# Patient Record
Sex: FEMALE | Race: WHITE
[De-identification: names, ages, dates, MRNs, and addresses within clinical notes are randomized per-mention and may not be internally consistent; named-entity substitution may affect disease eponyms.]

---

## 2017-01-12 ENCOUNTER — HOSPITAL ENCOUNTER (EMERGENCY)
Dept: HOSPITAL 75 - ER | Age: 22
Discharge: HOME | End: 2017-01-12
Payer: MEDICAID

## 2017-01-12 VITALS — HEIGHT: 63 IN | WEIGHT: 130 LBS | BODY MASS INDEX: 23.04 KG/M2

## 2017-01-12 VITALS — SYSTOLIC BLOOD PRESSURE: 128 MMHG | DIASTOLIC BLOOD PRESSURE: 70 MMHG

## 2017-01-12 DIAGNOSIS — L03.012: Primary | ICD-10-CM

## 2017-01-12 PROCEDURE — 87070 CULTURE OTHR SPECIMN AEROBIC: CPT

## 2017-01-12 PROCEDURE — 10060 I&D ABSCESS SIMPLE/SINGLE: CPT

## 2017-01-12 PROCEDURE — 87077 CULTURE AEROBIC IDENTIFY: CPT

## 2017-01-12 PROCEDURE — 87205 SMEAR GRAM STAIN: CPT

## 2017-01-12 PROCEDURE — 64450 NJX AA&/STRD OTHER PN/BRANCH: CPT

## 2017-01-12 PROCEDURE — 87186 SC STD MICRODIL/AGAR DIL: CPT

## 2017-01-12 NOTE — ED UPPER EXTREMITY
General


Stated Complaint:  FINGER INFECTION


Source:  patient


Exam Limitations:  no limitations





History of Present Illness


Time seen by provider:  17:44


Initial Comments


Pain redness and swelling to the left thumb for 1.5 weeks.  No known infection.

  She states that she has intermittently drained pus from this.


Onset:  last week


Severity:  moderate


Pain/Injury Location:  left thumb


Method of Injury:  unknown


Modifying Factors:  Worse With Movement





Allergies and Home Medications


Allergies


Coded Allergies:  


     latex (Unverified  Allergy, Unknown, 4/27/11)


     paroxetine (Unverified  Allergy, Unknown, 5/23/15)





Home Medications


Naproxen 500 Mg Tablet #30 500 MG PO BID 


   Prescribed by: NOE VALDIVIA on 8/25/16 1949


Sulfamethoxazole/Trimethoprim 1 Each Tablet #14 1 EACH PO BID 


   Prescribed by: NOE VALDIVIA on 1/12/17 1746


Topiramate 100 Mg Tablet 100 MG PO BID (Reported) 





Constitutional:   see HPI


EENTM:   see HPI


Respiratory:   no symptoms reported


Cardiovascular:   no symptoms reported


Genitourinary:   no symptoms reported


Musculoskeletal:   no symptoms reported


Skin:   no symptoms reported


Psychiatric/Neurological:   No Symptoms Reported





Past Medical-Social-Family Hx


Patient Social History


Recent Foreign Travel:  No


Contact w/Someone Who Travel:  No


Recent Hopitalizations:  No





Immunizations Up To Date


Tetanus Booster (TDap):  Less than 5yrs


PED Vaccines UTD:  Yes





Seasonal Allergies


Seasonal Allergies:  No





Surgeries


HX Surgeries:  Yes (RT KNEE SCOPE, EGD)


Surgeries:  Orthopedic





Respiratory


Hx Respiratory Disorders:  No





Cardiovascular


Hx Cardiac Disorders:  No





Neurological


Hx Neurological Disorders:  Yes (essential tremors)


Neurological Disorders:  Seizure Disorder





Reproductive System


Hx Reproductive Disorders:  No





Genitourinary


Hx Genitourinary Disorders:  No





Gastrointestinal


Hx Gastrointestinal Disorders:  No





Musculoskeletal


Hx Musculoskeletal Disorders:  No





Endocrine


Hx Endocrine Disorders:  No





HEENT


HX ENT Disorders:  No





Cancer


Hx Cancer:  No





Psychosocial


Hx Psychiatric Problems:  Yes


Behavioral Health Disorders:  Depression





Integumentary


HX Skin/Integumentary Disorder:  No





Blood Transfusions


Hx Blood Disorders:  No





Physical Exam


Vital Signs





 Vital Sign - Last 12Hours








 1/12/17 1/12/17





 17:48 17:57


 


Temp 98.3 


 


Pulse  70


 


Resp  16


 


B/P  132/70


 


O2 Delivery  Room Air





Capillary Refill :


General Appearance:   WD/WN no apparent distress


HEENT:   PERRL/EOMI normal ENT inspection


Neck:   non-tender full range of motion


Respiratory:   no respiratory distress no accessory muscle use


Elbow/Forearm:  normal inspection, non-tender, no evidence of injury


Wrist:  Yes normal inspection, Yes non-tender, Yes no evidence of injury


Hand:  Left, swelling (erythema and fluctuance over the eponychial/paronychIUM 

of left thumb)


Neurologic/Psychiatric:   alert normal mood/affect oriented x 3


Skin:   normal color warm/dry





I&D :  


   Blade Size:  11


Progress


Digital block done using 2 mL of 0.5 percent Marcaine without epinephrine.  

Incision was then made with 11 blade scalpel over the most fluctuant portion of 

this paronychia.  Culture was collected and sent to lab so there was really no 

purulence expressed.  Area then covered with gauze and Coban.





Progress/Results/Core Measures


Results/Orders


My Orders





 Orders-NOE VALDIVIA


Wound Culture (1/12/17 17:43)


Bupivacaine 0.5% Injection (Sensorcaine (1/12/17 17:45)


Sulfamethoxazole/Trimet Ds Tab (Bactrim (1/12/17 17:45)


Bupivacaine 0.5% Injection (Sensorcaine (1/12/17 17:39)





Medications Given in ED








 Current Medications








 Medications  Dose


 Ordered  Sig/Alirio


 Route  Start Time


 Stop Time Status Last Admin


Dose Admin


 


 Bupivacaine HCl  30 ml  STK-MED ONCE


 .ROUTE  1/12/17 17:39


 1/12/17 17:46 DC 1/12/17 17:48


30 ML











Vital Signs/I&O








 Vital Sign - Last 12Hours








 1/12/17 1/12/17





 17:48 17:57


 


Temp 98.3 97.5


 


Pulse  70


 


Resp  16


 


B/P  132/70


 


O2 Delivery  Room Air














Departure


Impression


Impression:  


 Primary Impression:  


 Paronychia of finger


 Qualified Code:  L03.012 - Cellulitis of left finger


Disposition:  01 HOME, SELF-CARE


Condition:  Stable





Departure-Patient Inst.


Decision time for Depature:  17:45


Referrals:  


NO,LOCAL PHYSICIAN (PCP/Family)


Primary Care Physician


Patient Instructions:  Paronychia





Add. Discharge Instructions:


1.  Return to ER for any concerns


2.  Follow-up with your doctor next week


3.  Antibiotics as directed


Scripts


Sulfamethoxazole/Trimethoprim (Bactrim Ds Tablet)1 Each Tablet1 Each PO BID #14 

TAB


   Prov:NOE VALDIVIA         1/12/17








NOE VALDIVIA 12, 2017 17:46

## 2017-01-12 NOTE — XMS REPORT
Continuity of Care Document

 Created on: 06/10/2016



DEMARIO PRICE

External Reference #: [id number]

: 1995

Sex: Female



Demographics







 Address  [address]

[place]  [address]

 

 Home Phone  [phone]

 

 Preferred Language  English

 

 Marital Status  Unknown

 

 Taoist Affiliation  Unknown

 

 Race  Unknown

 

 Ethnic Group  Unknown





Author







 Author  [facility]

 

 Organization  [facility]

 

 Address  Unknown

 

 Phone  Unavailable







Support







 Name  Relationship  Address  Phone

 

 NOE VALDIVIA  Caregiver  [facility]

 [address] [place]  [address] [phone]

 

 NO, LOCAL PHYSICIAN  Caregiver  Unknown  Unavailable

 

 ASHU SHARMA MD  Caregiver  [address], [address]

[place]  [address] [phone]

 

 MELISSA, BILL  Next Of Kin  [address]

[address]

[place]  [address] [phone]







Care Team Providers







 Care Team Member Name  Role  Phone

 

 NO, LOCAL PHYSICIAN  PCP  Unavailable







Advance Directives







 Directive  Response  Recorded Date/Time

 

 Advance Directives  No  06/10/16 3:04pm

 

 Organ Donor  No  06/10/16 3:04pm

 

 Resuscitation Status  Full Code  06/10/16 3:04pm







Chief Complaint and Reason for Visit







 Chief Complaint  Lower Extremity

 

 Reason for Visit  Internal derangement of right knee







Problems

Active Problems







 Medical Problem  Onset Date  Status

 

 Contusion of foot  Unknown  Acute

 

 Internal derangement of right knee  Unknown  Acute

 

 Traumatic hematoma of foot  Unknown  Acute







Medications

Current Home Medications







 Medication  Dose  Units  Route  Directions  Days/Qty  Instructions  Start Date

 

 Meloxicam 7.5 Mg  7.5  Mg  Oral  Daily  30     06/10/16





Past Home Medications







 Medication  Directions  Ordered  Status

 

 Hydrocodone Bit/Acetaminophen 1 Each Tablet, 1 Ea Oral  Every 6 Hours as 
needed for Severe Pain  05/23/15  Discontinued







Social History







 Social History Problem  Response  Recorded Date/Time

 

 Alcohol Use  Denies Use  06/10/2016 3:04pm

 

 Recreational Drug Use  No  06/10/2016 3:04pm

 

 Recent Foreign Travel  No  06/10/2016 3:04pm

 

 Recent Infectious Disease Exposure  No  06/10/2016 3:04pm

 

 Hospitalization with Isolation  Denies  06/10/2016 3:04pm

 

 Smoking Status  Never a Smoker  06/10/2016 3:04pm









 Query  Response  Start Date  Stop Date

 

 Smoking Status  Never a Smoker      







Hospital Discharge Instructions

No hospital discharge instructions.



Plan of Care







 Discharge Date  06/10/16 3:39pm

 

 Disposition  01 HOME, SELF-CARE

 

 Condition at Discharge  Improved

 

 Instructions/Education Provided  NO INSTRUCTIONS GIVEN

 

 Prescriptions  See Medication Section

 

 Referrals  GISEL ROSA MD, RICHARD A DO - NO,LOCAL PHYSICIAN - Primary Care Physician





ASHLEY MIX FLOYD R MD - 





LUZMARIA,TANYA GUZMÁN MD - 

 

 Additional Instructions/Education  1.  Call one of the physicians listed to 
make an appointment to be seen to 

schedule a MRI of your knee  

 2.  Medication as directed  

 3.  All discharge instructions reviewed with patient and/or family. Voiced 

understanding.







Functional Status

No functional status results.



Allergies, Adverse Reactions, Alerts







 Allergen  Type  Severity  Reaction  Status  Last Updated

 

 paroxetine ([id number])  Allergy  Unknown     Active  05/23/15

 

 Latex  Allergy  Unknown     Active  11







Immunizations

No immunization records.



Vital Signs

Acute Vital Signs





 Vital  Response  Date/Time

 

 Temperature (Fahrenheit)  97.4 degrees F (97.6 - 99.5)  06/10/2016 3:04pm

 

 Temperature (Calculated Celsius)  36.91726 degrees C (36.4 - 37.5)  06/10/2016 
3:04pm

 

 Temperature Source  Temporal  06/10/2016 3:04pm

 

 Pulse Rate (Adolescent 12-19yrs)  108 bpm (56 - 106)  06/10/2016 3:39pm

 

 O2 Sat by Pulse Oximetry  98 % (88 - 100)  06/10/2016 3:39pm

 

 Respiratory Rate (Adolescent 12-19yrs)  20 bpm (15 - 20)  06/10/2016 3:39pm

 

 Blood Pressure  /   

 

    Blood Pressure Systolic (Adolescent 12-19yrs)  121 mm Hg (115 - 120)  06/10/
2016 3:39pm

 

 Pain      

 

    Pain Intensity  10  06/10/2016 3:04pm

 

 Height (Feet)  5 feet  06/10/2016 3:04pm

 

 Height (Inches)  4 inches  06/10/2016 3:04pm

 

 Height (Calculated Centimeters)  162.563642 cm  06/10/2016 3:04pm

 

 Weight (Pounds)  139 pounds  06/10/2016 3:04pm

 

 Weight (Calculated Kilograms)  63.823901 kilograms  06/10/2016 3:04pm

 

 Calculated BMI  23.86  06/10/2016 3:04pm







Results

No known relevant diagnostic tests, laboratory data and/or discharge summary.



Procedures

No known history of procedures.



Encounters







 Encounter  Location  Arrival/Admit Date  Discharge/Depart Date  Attending 
Provider

 

 Departed Emergency Room  [facility]  06/10/16 2:57pm  06/10
/16 3:39pm  NOE VALDIVIA

 

 Registered Referred  [facility]  16 3:27pm     DAVID HAND











 Recent Diagnosis

## 2017-10-11 ENCOUNTER — HOSPITAL ENCOUNTER (OUTPATIENT)
Dept: HOSPITAL 75 - RAD | Age: 22
End: 2017-10-11
Attending: FAMILY MEDICINE
Payer: COMMERCIAL

## 2017-10-11 DIAGNOSIS — Z3A.10: ICD-10-CM

## 2017-10-11 DIAGNOSIS — O46.91: Primary | ICD-10-CM

## 2017-10-11 PROCEDURE — 76801 OB US < 14 WKS SINGLE FETUS: CPT

## 2017-10-11 NOTE — DIAGNOSTIC IMAGING REPORT
First trimester OB ultrasound.



INDICATION: Spotting.



FINDINGS: There is a normal-appearing single intrauterine

pregnancy. An embryo is seen with cardiac activity at 174 beats

per minute.



The crown-rump length is at 10 weeks and 5 days. ELIZABETH is [birth year]. 



IMPRESSION: Live single intrauterine pregnancy.



Dictated by: 



  Dictated on workstation # [id number]

## 2017-12-04 ENCOUNTER — HOSPITAL ENCOUNTER (OUTPATIENT)
Dept: HOSPITAL 75 - RAD | Age: 22
End: 2017-12-04
Attending: FAMILY MEDICINE
Payer: MEDICAID

## 2017-12-04 DIAGNOSIS — Z34.92: Primary | ICD-10-CM

## 2017-12-04 DIAGNOSIS — Z3A.18: ICD-10-CM

## 2017-12-04 PROCEDURE — 76805 OB US >/= 14 WKS SNGL FETUS: CPT

## 2017-12-04 NOTE — DIAGNOSTIC IMAGING REPORT
INDICATION: Fetal survey.



TECHNIQUE: Multiple real-time grayscale images were obtained over

the gravid uterus.



COMPARISON: 10/11/2017



FINDINGS: Fetal heart rate is 143 beats per minute. The amniotic

fluid is normal. The placenta is fundal and anterior with no

placenta previa. The lower uterine segment demonstrates closed

cervix, 4.3 cm in length.



The cord insertion, stomach, bladder, two umbilical arteries,

posterior fossa and lateral ventricles appear unremarkable. The

four-chamber view demonstrates a tiny mild focus of hypodensity

which could relate to an echogenic focus of the left ventricle, a

soft marker for aneuploidy. The upper and mid spine is normal in

appearance. The lower spine is not well demonstrated. The

maternal adnexa are obscured by bowel gas.



Biometrical measurements are as follows:

Biparietal 4.33 cm, age 19 weeks 1 days.

Head circumference 15.90 cm, age 18 weeks 6 days.

Abdominal circumference 12.26 cm, age 18 weeks 0 days.

Femur length 2.90 cm, age 19 weeks 0 days.



Sonographic estimate age: 18 weeks 6 days.

Sonographic estimated date of delivery: 05-01-18.



Estimated Fetal Weight: 240 gm (+/- 35  gm).

LMP percentile: 46%.



Fetal heart rate: 143 beats per minute.



Fetal number: 1 of 1.



IMPRESSION: 

1. The lower most aspect of the spine is not well demonstrated

due to fetal position. A followup exam is recommended within 2

weeks to reevaluate.

2. Questionable tiny echogenic focus in the left ventricle. This

is a soft marker that could be associated with aneuploidy.

Correlate clinically.



Dictated by: 



  Dictated on workstation # [id number]

## 2017-12-15 ENCOUNTER — HOSPITAL ENCOUNTER (OUTPATIENT)
Dept: HOSPITAL 75 - ER | Age: 22
Discharge: HOME | End: 2017-12-15
Attending: FAMILY MEDICINE
Payer: MEDICAID

## 2017-12-15 VITALS — WEIGHT: 125.25 LBS | BODY MASS INDEX: 22.19 KG/M2 | HEIGHT: 63 IN

## 2017-12-15 VITALS — SYSTOLIC BLOOD PRESSURE: 118 MMHG | DIASTOLIC BLOOD PRESSURE: 64 MMHG

## 2017-12-15 DIAGNOSIS — Z3A.24: ICD-10-CM

## 2017-12-15 DIAGNOSIS — O36.8120: Primary | ICD-10-CM

## 2017-12-15 LAB
BILIRUB UR QL STRIP: NEGATIVE
KETONES UR QL STRIP: NEGATIVE
LEUKOCYTE ESTERASE UR QL STRIP: (no result)
NITRITE UR QL STRIP: NEGATIVE
PH UR STRIP: 7 [PH] (ref 5–9)
PROT UR QL STRIP: NEGATIVE
SP GR UR STRIP: 1.01 (ref 1.02–1.02)
SQUAMOUS #/AREA URNS HPF: (no result) /HPF
UROBILINOGEN UR-MCNC: NORMAL MG/DL
WBC #/AREA URNS HPF: (no result) /HPF

## 2017-12-15 PROCEDURE — 99212 OFFICE O/P EST SF 10 MIN: CPT

## 2017-12-15 PROCEDURE — 81000 URINALYSIS NONAUTO W/SCOPE: CPT

## 2017-12-15 NOTE — XMS REPORT
[facility]

 Created on: 2017



Claudia Vaz

External Reference #: [id number]

: 1995

Sex: Female



Demographics







 Address  [address] [place]  [address]

 

 Preferred Language  Unknown

 

 Marital Status  Unknown

 

 [employer]ybeterian Affiliation  Unknown

 

 Race  Unknown

 

 Ethnic Group  Unknown





Author







 Author  ISH HERNANDEZ

 

 Organization  [facility]

 

 Address  [address] [place]  [address]



 

 Phone  [phone]







Care Team Providers







 Care Team Member Name  Role  Phone

 

 ISH HERNANDEZ  Unavailable  [phone]







PROBLEMS







 Type  Condition  ICD9-CM Code  ZIG43-KB Code  Onset Dates  Condition Status  
SNOMED Code

 

 Problem  Seasonal allergic rhinitis, unspecified allergic rhinitis trigger     
J30.2     Active  [id number]

 

 Problem  Other chronic pain     G89.29     Active  [id number]







ALLERGIES







 Substance  Reaction  Event Type  Date  Status

 

 latex  Unknown  Non Drug Allergy  26 May, 2017  Active







SOCIAL HISTORY

Never Assessed



PLAN OF CARE







 Activity  Details









  









 Follow Up  prn Reason:







VITAL SIGNS







 Height  63 in  2017

 

 Weight  130.2 lbs  2017

 

 Temperature  98.8 degrees Fahrenheit  2017

 

 Heart Rate  84 bpm  2017

 

 Respiratory Rate  22   2017

 

 BMI  23.06 kg/m2  2017

 

 Blood pressure systolic  100 mmHg  2017

 

 Blood pressure diastolic  70 mmHg  2017







MEDICATIONS

Unknown Medications



RESULTS







 Name  Result  Date  Reference Range

 

 A1C     2017   

 

 Hemoglobin A1c  5.4     4.8-5.6

 

 CBC     2017   

 

 WBC  7.2     3.4-10.8

 

 RBC  4.34     3.77-5.28

 

 Hemoglobin  13.7     11.1-15.9

 

 Hematocrit  41.1     34.0-46.6

 

 MCV  95     79-97

 

 MCH  31.6     26.6-33.0

 

 MCHC  33.3     31.5-35.7

 

 RDW  13.1     12.3-15.4

 

 Platelets  369     150-379

 

 Neutrophils  53      

 

 Lymphs  34      

 

 Monocytes  9      

 

 Eos  2      

 

 Basos  1      

 

 Neutrophils (Absolute)  3.9     1.4-7.0

 

 Lymphs (Absolute)  2.4     0.7-3.1

 

 Monocytes(Absolute)  0.6     0.1-0.9

 

 Eos (Absolute)  0.1     0.0-0.4

 

 Baso (Absolute)  0.1     0.0-0.2

 

 Immature Granulocytes  1      

 

 Immature Grans (Abs)  0.0     0.0-0.1

 

 CMP     2017   

 

 Glucose, Serum  91     65-99

 

 BUN  10     6-20

 

 Creatinine, Serum  0.58     0.57-1.00

 

 eGFR If NonAfricn Am  133         >59

 

 eGFR If Africn Am  153         >59

 

 BUN/Creatinine Ratio  17     9-23

 

 Sodium, Serum  139     134-144

 

 Potassium, Serum  4.6     3.5-5.2

 

 Chloride, Serum  102     

 

 Carbon Dioxide, Total  22     18-29

 

 Calcium, Serum  9.4     8.7-10.2

 

 Protein, Total, Serum  6.4     6.0-8.5

 

 Albumin, Serum  4.5     3.5-5.5

 

 Globulin, Total  1.9     1.5-4.5

 

 A/G Ratio  2.4     1.2-2.2

 

 Bilirubin, Total  0.6     0.0-1.2

 

 Alkaline Phosphatase, S  54     

 

 AST (SGOT)  12     0-40

 

 ALT (SGPT)  7     0-32







PROCEDURES







 Procedure  Date Ordered  Result  Body Site

 

 GLYCATED HEMOGLOBIN TEST  May 26, 2017      

 

 COMPLETE CBC W/AUTO DIFF WBC  May 26, 2017      

 

 COMPREHEN METABOLIC PANEL  May 26, 2017      

 

 VENIPUNCT, ROUTINE*  May 26, 2017      







IMMUNIZATIONS

No Known Immunizations



MEDICAL (GENERAL) HISTORY







 Type  Description  Date

 

 Medical History  epilepsy   

 

 Medical History  asthma   

 

 Medical History  diabetes type 1   

 

 Surgical History  Right knee scope   

 

 Surgical History  EGD  

 

 Surgical History  tonsilectomy   

 

 Surgical History  wisdom teeth   

 

 Hospitalization History  wisdom teeth   

 

 Hospitalization History  grand mal seizure x 1 week in 2nd grade

## 2017-12-15 NOTE — XMS REPORT
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
mass/volume)            35 g/dL            32-36        

 

 Automated erythrocyte distribution width ratio            12.5 %            
10.0-14.5        

 

 Automated blood platelet count (count/volume)            282 10*3/uL          
  130-400        

 

 Automated blood platelet mean volume measurement            10.1 [[id number]]     
       7.4-10.4        

 

 Automated blood neutrophils/100 leukocytes            51 %            42-75   
     

 

 Automated blood lymphocytes/100 leukocytes            38 %            12-44   
     

 

 Blood monocytes/100 leukocytes            9 %            0-12        

 

 Automated blood eosinophils/100 leukocytes            1 %            0-10     
   

 

 Automated blood basophils/100 leukocytes            1 %            0-10        

 

 Blood neutrophils automated count (number/volume)            4.4 10*3         
   1.8-7.8        

 

 Blood lymphocytes automated count (number/volume)            3.2 10*3         
   1.0-4.0        

 

 Blood monocytes automated count (number/volume)            0.8 10*3            
0.0-1.0        

 

 Automated eosinophil count            0.1 10*3/uL            0.0-0.3        

 

 Automated blood basophil count (count/volume)            0.1 10*3/uL          
  0.0-0.1        









 Whole blood basic metabolic panel - 17 05:45         









 Serum or plasma sodium measurement (moles/volume)            137 mmol/L       
     135-145        

 

 Serum or plasma potassium measurement (moles/volume)            4.0 mmol/L    
        3.6-5.0        

 

 Serum or plasma chloride measurement (moles/volume)            110 mmol/L     
               

 

 Carbon dioxide            19 mmol/L            21-32        

 

 Serum or plasma anion gap determination (moles/volume)            8 mmol/L    
        5-14        

 

 Serum or plasma urea nitrogen measurement (mass/volume)            6 mg/dL    
        7-18        

 

 Serum or plasma creatinine measurement (mass/volume)            0.51 mg/dL    
        0.60-1.30        

 

 Serum or plasma urea nitrogen/creatinine mass ratio            12             
NRG        

 

 Serum or plasma creatinine measurement with calculation of estimated 
glomerular filtration rate            >             NRG        

 

 Serum or plasma glucose measurement (mass/volume)            78 mg/dL         
           

 

 Serum or plasma calcium measurement (mass/volume)            8.6 mg/dL        
    8.5-10.1        









 Serum or plasma thyroxine (T4) free measurement (mass/volume) - 17 05:45
         









 Serum or plasma thyroxine (T4) free measurement (mass/volume)            1.39 
ng/dL            0.70-1.48        









 Hemoglobin A1c - 17 05:45         









 Hemoglobin A1c            4.8 %            4.5-6.2        









 Serum or plasma thyroperoxidase antibody assay (units/volume) - 17 05:45
         









 Serum or plasma thyroperoxidase antibody assay (units/volume)            40.34 
%            0..00        









 TRIIODOTHRYONINE T3 FREE - 17 05:45         









 TRIIODOTHYRONINE T3 FREE            3.6 pg/mL            2.4-4.5        









 Total triiodothyronine (T3) measurement - 17 05:45         









 Total triiodothyronine (T3) measurement            2.0 %            0.6-1.8   
     









 Capillary blood glucose measurement by glucometer (mass/volume) - 17 05:
47         









 Capillary blood glucose measurement by glucometer (mass/volume)            78 
mg/dL                    



                                                    



Encounters

      





 ACCT No.            Visit Date/Time            Discharge            Status    
        Pt. Type            Provider            Facility            Loc./Unit  
          Complaint        

 

 [id number]            2017 12:57:00            2017 23:59:59    
        CLS            Outpatient            ANABELLA FUNK, MARLINE MCLEOD            [facility]            RAD            15 WKS GESTATION OF 
PREGNANCY        

 

 [id number]            2017 16:40:00            2017 08:43:00    
        DIS            Inpatient            AJ LAUREN MD            [facility]            LDRP            HYPEREMESIS GRAVIDERIM 
       

 

 [id number]            10/11/2017 15:19:00            10/11/2017 23:59:59    
        CLS            Outpatient            ANABELLA FUNK, MARLINE MCLEOD            [facility]            RAD            VAGINAL BLEEDING        

 

 [id number]            2017 17:30:00            2017 18:15:00    
        DIS            Emergency            NOE VALDIVIA APRN            [facility]            ER            FINGER INFECTION        

 

 [id number]            2016 19:20:00            2016 20:02:00    
        DIS            Emergency            NOE VALDIVIA APRN            [facility]            ER            DENTAL PAIN        

 

 [id number]            2016 09:13:00            2016 09:56:00    
        DIS            Emergency            JULIÁN FUNK, CELI ARGUELLO            
[facility]            ER            R THUMB PAIN        

 

 [id number]            06/10/2016 14:57:00            06/10/2016 15:39:00    
        DIS            Emergency            NOE VALDIVIA            [facility]            ER            RIGHT KNEE PAIN        

 

 [id number]            2016 15:27:00            2016 23:59:59    
        CLS            Outpatient            DAVID HAND            
[facility]            QUICK                     

 

 [id number]            2015 12:12:00            2015 13:25:00    
        DIS            Emergency            NOE VALDIVIA            [facility]            ER            CHAIR FELL ON L FOOT     
   

 

 [id number]            12/15/2017 19:40:00                         ACT       
     Emergency            RANDY FUNK, YURI MCLEOD            [facility]            ER            CRAMPING, CANT FEEL  MOVEMENT FROM BABY     
   

 

 [id number]            10/10/2011 12:09:00                                   
   Document Registration                                                       
     

 

 [id number]            2011 15:05:00                                   
   Document Registration                                                       
     

 

 [id number]            2011 10:47:00                                   
   Document Registration                                                       
     

 

 [id number]            2011 21:10:00                                   
   Document Registration                                                       
     

 

 [id number]            2011 12:04:00                                   
   Document Registration                                                       
     

 

 [id number]            2011 11:50:00                                   
   Document Registration                                                       
     

 

 [id number]            2011 07:24:00                                   
   Document Registration

## 2017-12-20 NOTE — PHYSICIAN QUERY-FINAL DX
DAVIDA CLEMENTS 12/20/17 1118:


Clinic Account Progress/Dx


Physician Query:


Please give diagnosis


Date of Service





Dec 15, 2017 at 19:54





MARLINE KYLE MD 12/28/17 0740:


Clinic Account Progress/Dx


DIAGNOSIS:


Diagnosis


1.  Decreased fetal movement


2.  IUP at 24 weeks.











DAVIDA CLEMENTS Dec 20, 2017 11:18


MARLINE KYLE MD Dec 28, 2017 07:40

## 2018-01-24 ENCOUNTER — HOSPITAL ENCOUNTER (EMERGENCY)
Dept: HOSPITAL 75 - ER | Age: 23
Discharge: HOME | End: 2018-01-24
Payer: MEDICAID

## 2018-01-24 VITALS — HEIGHT: 63 IN | BODY MASS INDEX: 23.04 KG/M2 | WEIGHT: 130 LBS

## 2018-01-24 VITALS — DIASTOLIC BLOOD PRESSURE: 50 MMHG | SYSTOLIC BLOOD PRESSURE: 100 MMHG

## 2018-01-24 DIAGNOSIS — G40.909: ICD-10-CM

## 2018-01-24 DIAGNOSIS — K64.8: ICD-10-CM

## 2018-01-24 DIAGNOSIS — O99.352: ICD-10-CM

## 2018-01-24 DIAGNOSIS — Z3A.26: ICD-10-CM

## 2018-01-24 DIAGNOSIS — O99.612: Primary | ICD-10-CM

## 2018-01-24 DIAGNOSIS — O99.342: ICD-10-CM

## 2018-01-24 DIAGNOSIS — F32.9: ICD-10-CM

## 2018-01-24 PROCEDURE — 99282 EMERGENCY DEPT VISIT SF MDM: CPT

## 2018-01-24 NOTE — ED GI
General


Chief Complaint:  Rect Problems


Stated Complaint:  HEMROID





History of Present Illness


Date Seen by Provider:  Jan 24, 2018


Time Seen by Provider:  16:50


Initial Comments


22-year-old  female Patient is 26 weeks gestation. She reports no 

bowel movement for 10 days. She is having mild abdominal pain. She is feeling 

fetal movement approximately every 4 hours. She has some lactose intolerance 

and usually has diarrhea after drinking milk. She is taking a prenatal vitamin, 

it is the same one she has been taking since beginning of the pregnancy. She 

has no history of hemorrhoids in the past, she noticed tenderness and fullness, 

with external hemorrhoid present yesterday. She attempted to see Dr. Kyle 

today and was referred to the emergency department.  She is concerned the 

hemorrhoid is "blocking ability to pass stools."


Timing/Duration:  Other (10 days)





Allergies and Home Medications


Allergies


Coded Allergies:  


     divalproex sodium (Verified  Allergy, Unknown, 12/15/17)


     latex (Unverified  Allergy, Unknown, 4/27/11)


     paroxetine (Unverified  Allergy, Unknown, 5/23/15)





Home Medications


Metoclopramide HCl 5 Mg Tablet, 5 MG PO Q6H PRN for NAUSEA/VOMITING-1ST LINE 

for 30 Days, #90


   Prescribed by: MARIBELL NICK on 11/3/17 0843


Prenatal Vit No.124/Iron/FA 1 Each Tablet, 1 EACH PO DAILY, (Reported)





Review of Systems


Constitutional:  no symptoms reported, see HPI


Gastrointestinal:  See HPI, Abdominal Pain, Constipated, Other (external 

hemorrhoid)





All Other Systems Reviewed


Negative Unless Noted:  Yes





Past Medical-Social-Family Hx


Patient Social History


Recent Foreign Travel:  No


Contact w/Someone Who Travel:  No


Recent Hopitalizations:  No





Immunizations Up To Date


Tetanus Booster (TDap):  Less than 5yrs


PED Vaccines UTD:  Yes


Date of Influenza Vaccine:  Aug 15, 2017





Seasonal Allergies


Seasonal Allergies:  No





Surgeries


Surgeries:  Orthopedic





Neurological


Neurological Disorders:  Seizure Disorder





Reproductive System


Hx Reproductive Disorders:  No





Psychosocial


Behavioral Health Disorders:  Depression





Reviewed Nursing Assessment


Reviewed/Agree w Nursing PMH:  Yes





Physical Exam


Vital Signs





 VS - Last 72 Hours, by Label








 1/24/18





 16:51


 


Temp 98.1


 


Pulse 70


 


Resp 16


 


B/P (MAP) 100/50 (67)


 


Pulse Ox 98


 


O2 Delivery Room Air





Capillary Refill :


General Appearance:  WD/WN, no apparent distress


Respiratory:  chest non-tender, lungs clear, normal breath sounds


Cardiovascular:  normal peripheral pulses, regular rate, rhythm, no murmur


Gastrointestinal:  normal bowel sounds, non tender, soft, distended (compatible 

with third trimester pregnancy), No rebound


Rectal:  normal exam, normal rectal tone, hemorrhoids (0.5 cm, tender, non-

inflammed, soft, right external. No impacted stool in rectum ), tenderness


Extremities:  normal range of motion, non-tender, no calf tenderness, normal 

capillary refill


Neurologic/Psychiatric:  no motor/sensory deficits, alert, normal mood/affect, 

oriented x 3


Skin:  normal color, warm/dry





Progress/Results/Core Measures


Results/Orders


Vital Signs/I&O





Vital Sign - Last 12Hours








 1/24/18





 16:51


 


Temp 98.1


 


Pulse 70


 


Resp 16


 


B/P (MAP) 100/50 (67)


 


Pulse Ox 98


 


O2 Delivery Room Air








Progress Note :  


   Time:  16:50


Progress Note


Initial evaluation, fetal heart tones 140s to 150s. 


1700 discussed at length with the patient and her mother the importance of 

increasing fluid in her diet, high fiber diet, over-the-counter hydrocortisone 

for inflamed hemorrhoid, stool softener, warm moist compresses, and avoid 

straining. Sits baths were recommended, the patient reports that she does not 

have a bath tub. Discharge instructions and return precautions reviewed at 

length with the patient and her mother, all questions answered.





Departure


Impression


Impression:  


 Primary Impression:  


 Constipation during pregnancy


 Qualified Codes:  O99.613 - Diseases of the digestive system complicating 

pregnancy, third trimester; K59.00 - Constipation, unspecified


 Additional Impression:  


 Hemorrhoid


 Qualified Codes:  K64.0 - First degree hemorrhoids


Disposition:  01 HOME, SELF-CARE


Condition:  Stable





Departure-Patient Inst.


Decision time for Depature:  17:15


Referrals:  


MARLINE KYLE MD (PCP/Family)


Primary Care Physician


Patient Instructions:  Constipation, Adult (DC), Hemorrhoids (DC), High Fiber 

Diet





Add. Discharge Instructions:  


Increase water intake while awake, 16 ounces every 2 hours.


Ambulates 10 minutes every hour while awake.


Warm moist compresses to hemorrhoid, 10 minutes every 2 hours.


Increase high-fiber foods in diet.


Colace over-the-counter stool softener, 1 tablet twice daily until having 

regular stools.


Avoid straining or sitting on toilet for prolonged period of time.


Hydrocortisone cream, applied to hemorrhoid 3-4 times daily.


Follow-up with Dr. Kyle if no improvement in symptoms in the next 2 days.


Return to emergency department for increased abdominal pain, lack of fetal 

movement, vaginal bleeding or discharge, or new problems.





All discharge instructions reviewed with patient and/or family. Voiced 

understanding.





Copy


Copies To 1:   MARLINE KYLE MD, AMY ARNP Jan 24, 2018 16:48

## 2018-02-15 ENCOUNTER — HOSPITAL ENCOUNTER (OUTPATIENT)
Dept: HOSPITAL 75 - LAB | Age: 23
End: 2018-02-15
Attending: FAMILY MEDICINE
Payer: MEDICAID

## 2018-02-15 DIAGNOSIS — Z34.93: Primary | ICD-10-CM

## 2018-02-15 PROCEDURE — 82951 GLUCOSE TOLERANCE TEST (GTT): CPT

## 2018-02-15 PROCEDURE — 82962 GLUCOSE BLOOD TEST: CPT

## 2018-02-15 PROCEDURE — 82952 GTT-ADDED SAMPLES: CPT

## 2018-02-15 PROCEDURE — 36415 COLL VENOUS BLD VENIPUNCTURE: CPT

## 2018-02-22 ENCOUNTER — HOSPITAL ENCOUNTER (OUTPATIENT)
Dept: HOSPITAL 75 - RAD | Age: 23
End: 2018-02-22
Attending: FAMILY MEDICINE
Payer: MEDICAID

## 2018-02-22 DIAGNOSIS — Z3A.30: ICD-10-CM

## 2018-02-22 DIAGNOSIS — Z34.93: Primary | ICD-10-CM

## 2018-02-22 PROCEDURE — 76816 OB US FOLLOW-UP PER FETUS: CPT

## 2018-02-22 NOTE — DIAGNOSTIC IMAGING REPORT
INDICATION: Followup fetal heart and spine.



TECHNIQUE: Multiple real-time grayscale images were obtained over

the gravid uterus.



COMPARISON: 12/04/2017.



FINDINGS: There is a single live fetus in a cephalic

presentation. The placenta is posterior. The amniotic fluid

volume is normal. Fetal heart rate was recorded at 143 beats per

minute. Fetal spine is unremarkable on today's study. The

previously noted echogenic focus in the left ventricle is not

appreciated on today's exam.



Biometrical measurements are as follows:

Biparietal 7.9 cm, age 31 weeks 4 days.

Head circumference 28.4 cm, age 31 weeks 1 days.

Abdominal circumference 26.0 cm, age 30 weeks 2 days.

Femur length 5.5 cm, age 29 weeks 0 days.



Sonographic estimate age: 30 weeks 4 days.

Sonographic estimated date of delivery: 04/29/18.



Estimated Fetal Weight: 1485 gm (+/- 217 gm).

LMP percentile: 40%.



Fetal heart rate: 143 beats per minute.



Fetal number: 1 of 1.



IMPRESSION: Single live IUP of approximately 30-31 weeks

gestational age demonstrating normal interval growth when

compared with prior exam. Fetal spine and fetal heart are

unremarkable on today's exam.



Dictated by: 



  Dictated on workstation # [id number]

## 2018-03-27 ENCOUNTER — HOSPITAL ENCOUNTER (OUTPATIENT)
Dept: HOSPITAL 75 - WSO | Age: 23
Discharge: HOME | End: 2018-03-27
Attending: FAMILY MEDICINE
Payer: MEDICAID

## 2018-03-27 VITALS — DIASTOLIC BLOOD PRESSURE: 66 MMHG | SYSTOLIC BLOOD PRESSURE: 123 MMHG

## 2018-03-27 VITALS — DIASTOLIC BLOOD PRESSURE: 72 MMHG | SYSTOLIC BLOOD PRESSURE: 112 MMHG

## 2018-03-27 VITALS — SYSTOLIC BLOOD PRESSURE: 114 MMHG | DIASTOLIC BLOOD PRESSURE: 66 MMHG

## 2018-03-27 VITALS — SYSTOLIC BLOOD PRESSURE: 117 MMHG | DIASTOLIC BLOOD PRESSURE: 69 MMHG

## 2018-03-27 VITALS — HEIGHT: 63 IN | WEIGHT: 144 LBS | BODY MASS INDEX: 25.52 KG/M2

## 2018-03-27 DIAGNOSIS — N85.8: ICD-10-CM

## 2018-03-27 DIAGNOSIS — O99.89: Primary | ICD-10-CM

## 2018-03-27 DIAGNOSIS — Z3A.34: ICD-10-CM

## 2018-03-27 LAB
BASOPHILS # BLD AUTO: 0 10^3/UL (ref 0–0.1)
BASOPHILS NFR BLD AUTO: 0 % (ref 0–10)
BASOPHILS NFR BLD MANUAL: 0 %
EOSINOPHIL # BLD AUTO: 0 10^3/UL (ref 0–0.3)
EOSINOPHIL NFR BLD AUTO: 0 % (ref 0–10)
EOSINOPHIL NFR BLD MANUAL: 0 %
ERYTHROCYTE [DISTWIDTH] IN BLOOD BY AUTOMATED COUNT: 12.7 % (ref 10–14.5)
HCT VFR BLD CALC: 31 % (ref 35–52)
HGB BLD-MCNC: 10.5 G/DL (ref 11.5–16)
LYMPHOCYTES # BLD AUTO: 2.2 X 10^3 (ref 1–4)
LYMPHOCYTES NFR BLD AUTO: 13 % (ref 12–44)
MANUAL DIFFERENTIAL PERFORMED BLD QL: YES
MCH RBC QN AUTO: 32 PG (ref 25–34)
MCHC RBC AUTO-ENTMCNC: 34 G/DL (ref 32–36)
MCV RBC AUTO: 93 FL (ref 80–99)
MONOCYTES # BLD AUTO: 0.8 X 10^3 (ref 0–1)
MONOCYTES NFR BLD AUTO: 5 % (ref 0–12)
MONOCYTES NFR BLD: 4 %
NEUTROPHILS # BLD AUTO: 14 X 10^3 (ref 1.8–7.8)
NEUTROPHILS NFR BLD AUTO: 82 % (ref 42–75)
NEUTS BAND NFR BLD MANUAL: 79 %
NEUTS BAND NFR BLD: 2 %
PLATELET # BLD: 312 10^3/UL (ref 130–400)
PMV BLD AUTO: 9.3 FL (ref 7.4–10.4)
RBC # BLD AUTO: 3.29 10^6/UL (ref 4.35–5.85)
RBC MORPH BLD: NORMAL
VARIANT LYMPHS NFR BLD MANUAL: 15 %
WBC # BLD AUTO: 17.1 10^3/UL (ref 4.3–11)

## 2018-03-27 PROCEDURE — 96374 THER/PROPH/DIAG INJ IV PUSH: CPT

## 2018-03-27 PROCEDURE — 99214 OFFICE O/P EST MOD 30 MIN: CPT

## 2018-03-27 PROCEDURE — 85027 COMPLETE CBC AUTOMATED: CPT

## 2018-03-27 PROCEDURE — 85007 BL SMEAR W/DIFF WBC COUNT: CPT

## 2018-03-27 PROCEDURE — 96361 HYDRATE IV INFUSION ADD-ON: CPT

## 2018-03-27 PROCEDURE — 76815 OB US LIMITED FETUS(S): CPT

## 2018-03-27 PROCEDURE — 36415 COLL VENOUS BLD VENIPUNCTURE: CPT

## 2018-03-27 PROCEDURE — 96372 THER/PROPH/DIAG INJ SC/IM: CPT

## 2018-03-27 RX ADMIN — SODIUM CHLORIDE, SODIUM LACTATE, POTASSIUM CHLORIDE, AND CALCIUM CHLORIDE SCH MLS/HR: 600; 310; 30; 20 INJECTION, SOLUTION INTRAVENOUS at 10:02

## 2018-03-27 RX ADMIN — SODIUM CHLORIDE, SODIUM LACTATE, POTASSIUM CHLORIDE, AND CALCIUM CHLORIDE SCH MLS/HR: 600; 310; 30; 20 INJECTION, SOLUTION INTRAVENOUS at 14:35

## 2018-03-27 RX ADMIN — TERBUTALINE SULFATE SCH MG: 1 INJECTION SUBCUTANEOUS at 12:16

## 2018-03-27 RX ADMIN — TERBUTALINE SULFATE SCH MG: 1 INJECTION SUBCUTANEOUS at 10:59

## 2018-03-27 NOTE — DIAGNOSTIC IMAGING REPORT
INDICATION: Fall with pelvic cramping.



FINDINGS: Limited obstetric ultrasound was performed. There is a

single live fetus in a cephalic presentation. The placenta is to

the right. No definite placental abruption or retroplacental

fluid collection is seen. The amniotic fluid volume is normal.

Fetal heart rate is recorded at 150 beats per minute.



IMPRESSION: Limited obstetric ultrasound without gross

abnormality.



Dictated by: 



  Dictated on workstation # [id number]

## 2018-03-28 NOTE — PHYSICIAN QUERY-FINAL DX
DAVIDA CLEMENTS 3/28/18 1018:


Clinic Account Progress/Dx


Physician Query:


Please give diagnosis


Date of Service





Mar 27, 2018 at 08:40





MARLINE KYLE MD 3/29/18 0714:


Clinic Account Progress/Dx


DIAGNOSIS:


Diagnosis


1.  Uterine irritability--non labor


2.  IUP at 34 weeks











DAVIDA CLEMENTS Mar 28, 2018 10:18


MARLINE KYLE MD Mar 29, 2018 07:14

## 2018-03-31 ENCOUNTER — HOSPITAL ENCOUNTER (OUTPATIENT)
Dept: HOSPITAL 75 - WSO | Age: 23
Discharge: HOME | End: 2018-03-31
Attending: FAMILY MEDICINE
Payer: MEDICAID

## 2018-03-31 VITALS — BODY MASS INDEX: 24.8 KG/M2 | HEIGHT: 63 IN | WEIGHT: 140 LBS

## 2018-03-31 VITALS — DIASTOLIC BLOOD PRESSURE: 72 MMHG | SYSTOLIC BLOOD PRESSURE: 107 MMHG

## 2018-03-31 DIAGNOSIS — Z3A.35: ICD-10-CM

## 2018-03-31 DIAGNOSIS — O47.03: Primary | ICD-10-CM

## 2018-03-31 PROCEDURE — 99213 OFFICE O/P EST LOW 20 MIN: CPT

## 2018-04-02 NOTE — PHYSICIAN QUERY-FINAL DX
DAVIDA CLEMENTS 4/2/18 1211:


Clinic Account Progress/Dx


Physician Query:


Please give diagnosis


Date of Service





Mar 31, 2018 at 18:51





MARLINE KYLE MD 4/3/18 0716:


Clinic Account Progress/Dx


DIAGNOSIS:


Diagnosis


1.  IUP at 35 weeks non labor











DAVIDA CLEMENTS Apr 2, 2018 12:11


MARLINE KYLE MD Apr 3, 2018 07:16

## 2018-04-18 ENCOUNTER — HOSPITAL ENCOUNTER (INPATIENT)
Dept: HOSPITAL 75 - LDRP | Age: 23
LOS: 2 days | Discharge: HOME | End: 2018-04-20
Attending: FAMILY MEDICINE | Admitting: FAMILY MEDICINE
Payer: MEDICAID

## 2018-04-18 VITALS — DIASTOLIC BLOOD PRESSURE: 69 MMHG | SYSTOLIC BLOOD PRESSURE: 109 MMHG

## 2018-04-18 VITALS — DIASTOLIC BLOOD PRESSURE: 63 MMHG | SYSTOLIC BLOOD PRESSURE: 110 MMHG

## 2018-04-18 VITALS — SYSTOLIC BLOOD PRESSURE: 135 MMHG | DIASTOLIC BLOOD PRESSURE: 82 MMHG

## 2018-04-18 VITALS — DIASTOLIC BLOOD PRESSURE: 64 MMHG | SYSTOLIC BLOOD PRESSURE: 109 MMHG

## 2018-04-18 VITALS — DIASTOLIC BLOOD PRESSURE: 66 MMHG | SYSTOLIC BLOOD PRESSURE: 108 MMHG

## 2018-04-18 VITALS — DIASTOLIC BLOOD PRESSURE: 78 MMHG | SYSTOLIC BLOOD PRESSURE: 111 MMHG

## 2018-04-18 VITALS — DIASTOLIC BLOOD PRESSURE: 70 MMHG | SYSTOLIC BLOOD PRESSURE: 112 MMHG

## 2018-04-18 VITALS — DIASTOLIC BLOOD PRESSURE: 67 MMHG | SYSTOLIC BLOOD PRESSURE: 108 MMHG

## 2018-04-18 VITALS — DIASTOLIC BLOOD PRESSURE: 70 MMHG | SYSTOLIC BLOOD PRESSURE: 106 MMHG

## 2018-04-18 VITALS — SYSTOLIC BLOOD PRESSURE: 108 MMHG | DIASTOLIC BLOOD PRESSURE: 71 MMHG

## 2018-04-18 VITALS — SYSTOLIC BLOOD PRESSURE: 108 MMHG | DIASTOLIC BLOOD PRESSURE: 72 MMHG

## 2018-04-18 VITALS — WEIGHT: 143 LBS | HEIGHT: 63 IN | BODY MASS INDEX: 25.34 KG/M2

## 2018-04-18 VITALS — SYSTOLIC BLOOD PRESSURE: 114 MMHG | DIASTOLIC BLOOD PRESSURE: 66 MMHG

## 2018-04-18 VITALS — SYSTOLIC BLOOD PRESSURE: 108 MMHG | DIASTOLIC BLOOD PRESSURE: 69 MMHG

## 2018-04-18 DIAGNOSIS — G40.909: ICD-10-CM

## 2018-04-18 DIAGNOSIS — J45.909: ICD-10-CM

## 2018-04-18 DIAGNOSIS — Z3A.37: ICD-10-CM

## 2018-04-18 LAB
BASOPHILS # BLD AUTO: 0.1 10^3/UL (ref 0–0.1)
BASOPHILS NFR BLD AUTO: 1 % (ref 0–10)
EOSINOPHIL # BLD AUTO: 0.1 10^3/UL (ref 0–0.3)
EOSINOPHIL NFR BLD AUTO: 1 % (ref 0–10)
ERYTHROCYTE [DISTWIDTH] IN BLOOD BY AUTOMATED COUNT: 13.1 % (ref 10–14.5)
HCT VFR BLD CALC: 32 % (ref 35–52)
HGB BLD-MCNC: 11 G/DL (ref 11.5–16)
LYMPHOCYTES # BLD AUTO: 2.9 X 10^3 (ref 1–4)
LYMPHOCYTES NFR BLD AUTO: 23 % (ref 12–44)
MANUAL DIFFERENTIAL PERFORMED BLD QL: NO
MCH RBC QN AUTO: 31 PG (ref 25–34)
MCHC RBC AUTO-ENTMCNC: 34 G/DL (ref 32–36)
MCV RBC AUTO: 92 FL (ref 80–99)
MONOCYTES # BLD AUTO: 1 X 10^3 (ref 0–1)
MONOCYTES NFR BLD AUTO: 8 % (ref 0–12)
NEUTROPHILS # BLD AUTO: 8.7 X 10^3 (ref 1.8–7.8)
NEUTROPHILS NFR BLD AUTO: 68 % (ref 42–75)
PLATELET # BLD: 318 10^3/UL (ref 130–400)
PMV BLD AUTO: 10.6 FL (ref 7.4–10.4)
RBC # BLD AUTO: 3.54 10^6/UL (ref 4.35–5.85)
WBC # BLD AUTO: 12.8 10^3/UL (ref 4.3–11)

## 2018-04-18 PROCEDURE — 99212 OFFICE O/P EST SF 10 MIN: CPT

## 2018-04-18 PROCEDURE — 86901 BLOOD TYPING SEROLOGIC RH(D): CPT

## 2018-04-18 PROCEDURE — 86850 RBC ANTIBODY SCREEN: CPT

## 2018-04-18 PROCEDURE — 85025 COMPLETE CBC W/AUTO DIFF WBC: CPT

## 2018-04-18 PROCEDURE — 36415 COLL VENOUS BLD VENIPUNCTURE: CPT

## 2018-04-18 PROCEDURE — 86762 RUBELLA ANTIBODY: CPT

## 2018-04-18 PROCEDURE — 86900 BLOOD TYPING SEROLOGIC ABO: CPT

## 2018-04-19 VITALS — SYSTOLIC BLOOD PRESSURE: 116 MMHG | DIASTOLIC BLOOD PRESSURE: 57 MMHG

## 2018-04-19 VITALS — SYSTOLIC BLOOD PRESSURE: 110 MMHG | DIASTOLIC BLOOD PRESSURE: 61 MMHG

## 2018-04-19 VITALS — DIASTOLIC BLOOD PRESSURE: 72 MMHG | SYSTOLIC BLOOD PRESSURE: 115 MMHG

## 2018-04-19 VITALS — SYSTOLIC BLOOD PRESSURE: 90 MMHG | DIASTOLIC BLOOD PRESSURE: 58 MMHG

## 2018-04-19 VITALS — DIASTOLIC BLOOD PRESSURE: 57 MMHG | SYSTOLIC BLOOD PRESSURE: 97 MMHG

## 2018-04-19 VITALS — DIASTOLIC BLOOD PRESSURE: 66 MMHG | SYSTOLIC BLOOD PRESSURE: 103 MMHG

## 2018-04-19 VITALS — SYSTOLIC BLOOD PRESSURE: 106 MMHG | DIASTOLIC BLOOD PRESSURE: 73 MMHG

## 2018-04-19 VITALS — DIASTOLIC BLOOD PRESSURE: 61 MMHG | SYSTOLIC BLOOD PRESSURE: 119 MMHG

## 2018-04-19 VITALS — DIASTOLIC BLOOD PRESSURE: 58 MMHG | SYSTOLIC BLOOD PRESSURE: 108 MMHG

## 2018-04-19 VITALS — DIASTOLIC BLOOD PRESSURE: 73 MMHG | SYSTOLIC BLOOD PRESSURE: 112 MMHG

## 2018-04-19 VITALS — DIASTOLIC BLOOD PRESSURE: 55 MMHG | SYSTOLIC BLOOD PRESSURE: 96 MMHG

## 2018-04-19 VITALS — SYSTOLIC BLOOD PRESSURE: 158 MMHG | DIASTOLIC BLOOD PRESSURE: 61 MMHG

## 2018-04-19 VITALS — SYSTOLIC BLOOD PRESSURE: 100 MMHG | DIASTOLIC BLOOD PRESSURE: 62 MMHG

## 2018-04-19 VITALS — SYSTOLIC BLOOD PRESSURE: 107 MMHG | DIASTOLIC BLOOD PRESSURE: 58 MMHG

## 2018-04-19 VITALS — DIASTOLIC BLOOD PRESSURE: 66 MMHG | SYSTOLIC BLOOD PRESSURE: 105 MMHG

## 2018-04-19 VITALS — DIASTOLIC BLOOD PRESSURE: 65 MMHG | SYSTOLIC BLOOD PRESSURE: 101 MMHG

## 2018-04-19 VITALS — SYSTOLIC BLOOD PRESSURE: 105 MMHG | DIASTOLIC BLOOD PRESSURE: 68 MMHG

## 2018-04-19 VITALS — SYSTOLIC BLOOD PRESSURE: 110 MMHG | DIASTOLIC BLOOD PRESSURE: 71 MMHG

## 2018-04-19 VITALS — DIASTOLIC BLOOD PRESSURE: 75 MMHG | SYSTOLIC BLOOD PRESSURE: 123 MMHG

## 2018-04-19 VITALS — DIASTOLIC BLOOD PRESSURE: 61 MMHG | SYSTOLIC BLOOD PRESSURE: 107 MMHG

## 2018-04-19 VITALS — DIASTOLIC BLOOD PRESSURE: 63 MMHG | SYSTOLIC BLOOD PRESSURE: 111 MMHG

## 2018-04-19 VITALS — DIASTOLIC BLOOD PRESSURE: 68 MMHG | SYSTOLIC BLOOD PRESSURE: 112 MMHG

## 2018-04-19 VITALS — SYSTOLIC BLOOD PRESSURE: 104 MMHG | DIASTOLIC BLOOD PRESSURE: 64 MMHG

## 2018-04-19 VITALS — DIASTOLIC BLOOD PRESSURE: 65 MMHG | SYSTOLIC BLOOD PRESSURE: 103 MMHG

## 2018-04-19 VITALS — DIASTOLIC BLOOD PRESSURE: 68 MMHG | SYSTOLIC BLOOD PRESSURE: 102 MMHG

## 2018-04-19 VITALS — DIASTOLIC BLOOD PRESSURE: 59 MMHG | SYSTOLIC BLOOD PRESSURE: 107 MMHG

## 2018-04-19 VITALS — SYSTOLIC BLOOD PRESSURE: 102 MMHG | DIASTOLIC BLOOD PRESSURE: 61 MMHG

## 2018-04-19 VITALS — DIASTOLIC BLOOD PRESSURE: 59 MMHG | SYSTOLIC BLOOD PRESSURE: 104 MMHG

## 2018-04-19 VITALS — SYSTOLIC BLOOD PRESSURE: 103 MMHG | DIASTOLIC BLOOD PRESSURE: 61 MMHG

## 2018-04-19 VITALS — SYSTOLIC BLOOD PRESSURE: 111 MMHG | DIASTOLIC BLOOD PRESSURE: 63 MMHG

## 2018-04-19 RX ADMIN — IBUPROFEN SCH MG: 600 TABLET ORAL at 06:49

## 2018-04-19 RX ADMIN — IBUPROFEN SCH MG: 600 TABLET ORAL at 14:08

## 2018-04-19 RX ADMIN — IBUPROFEN SCH MG: 600 TABLET ORAL at 19:49

## 2018-04-19 NOTE — ANESTHESIA-REGIONAL POST-OP
Regional


Patient Condition


Mental Status:  Alert, Oriented x3


Circulation:  Same as Pre-Op


Headache:  Absent


Sensation:  Full Recovery


Motor Block:  Absent





Post Op Complications


Complications


None





Follow Up Care/Instructions


Patient Instructions


None needed.





Anesthesia/Patient Condition


Patient is doing well, no complaints, stable vital signs, no apparent adverse 

anesthesia problems.   


No complications reported per nursing.











ARMIDA IMGUEL CRNA Apr 19, 2018 11:38

## 2018-04-19 NOTE — HISTORY & PHYSICAL-OB
OB - Chief Complaint & HPI


Date/Time


Date of Admission:


Date of Admission:  2018 at 22:35


Time Seen by Provider:  04:00





Chief Complaint/History


OB-Reason for Admission/Chief:  Rupture of Membranes


Hx :  1


Hx Para:  0


Expected Date of Delivery:  May 5, 2018


Gestational Age in Weeks:  37


Gestational Age in Days:  5


Other reason for admission:


SROM


Admission Nurse Assessment Rev:  Yes


History of Labs


GBS negative





Allergies and Home Medications


Allergies


Coded Allergies:  


     divalproex sodium (Verified  Allergy, Unknown, 12/15/17)


     latex (Verified  Allergy, Unknown, 18)


     paroxetine (Verified  Allergy, Unknown, 18)





Home Medications


Prenatal Vit No.124/Iron/FA 1 Each Tablet, 1 EACH PO DAILY, (Reported)





Patient Home Medication List


Home Medication List Reviewed:  Yes





OB - History


Hx of Present Pregnancy


Prenatal Care:  Yes


Ultrasounds:  Normal mid trimester US


Obstetrical Complications:  None


Medical Complications:  None





Delivery History


Hx Blood Disorders:  No





Patient Past Medical History





PMHx:


Seizure disorder (reported last seizure ), not currently on medication


Asthma


Reported h/o "diet controlled type I diabetes"





Social History/Family History


Recent Infectious Disease Expo:  No


Alcohol Use:  Denies Use


Recreational Drug Use:  No





Immunizations


Tetanus Booster (TDap):  Less than 5yrs


Date of Influenza Vaccine:  Aug 15, 2017





OB - Admission Exam


Physical Exam


Vitals:





Vital Signs








 18





 01:20 02:35 03:05


 


Temp  98.9 


 


Pulse   104


 


Resp   16


 


B/P (MAP)   102/68 (79)


 


Pulse Ox 98  


 


O2 Delivery   Room Air








HEENT:  Moist Membranes


Heart:  Rhythm Normal


Lungs:  Clear


Abdomen:  Gravid


Cervical Dilatation:  2cm (on admission)


Effacement:  75%


Station:  -2


Membranes:  Ruptured


Amniotic Fluid:  Clear


Fetal Heart Rate:  130's


Accelerations:  Accelerations Present


Decelerations:  No Decelerations


Short Term Variability:  Present


Long Term Variability:  Average (6-25)


Contractions on Admission:  < 5 Minutes Apart


Labs





Laboratory Tests








Test


 18


22:50 Range/Units


 


 


White Blood Count


 12.8 H


 4.3-11.0


10^3/uL


 


Red Blood Count


 3.54 L


 4.35-5.85


10^6/uL


 


Hemoglobin 11.0 L 11.5-16.0  G/DL


 


Hematocrit 32 L 35-52  %


 


Mean Corpuscular Volume 92  80-99  FL


 


Mean Corpuscular Hemoglobin 31  25-34  PG


 


Mean Corpuscular Hemoglobin


Concent 34 


 32-36  G/DL





 


Red Cell Distribution Width 13.1  10.0-14.5  %


 


Platelet Count


 318 


 130-400


10^3/uL


 


Mean Platelet Volume 10.6 H 7.4-10.4  FL


 


Neutrophils (%) (Auto) 68  42-75  %


 


Lymphocytes (%) (Auto) 23  12-44  %


 


Monocytes (%) (Auto) 8  0-12  %


 


Eosinophils (%) (Auto) 1  0-10  %


 


Basophils (%) (Auto) 1  0-10  %


 


Neutrophils # (Auto) 8.7 H 1.8-7.8  X 10^3


 


Lymphocytes # (Auto) 2.9  1.0-4.0  X 10^3


 


Monocytes # (Auto) 1.0  0.0-1.0  X 10^3


 


Eosinophils # (Auto)


 0.1 


 0.0-0.3


10^3/uL


 


Basophils # (Auto)


 0.1 


 0.0-0.1


10^3/uL











OB - Assessment/Plan/Diagnosis


Assessment


Assessment:  rupture of membranes (at 37w5d)


Admission Dx


1.  IUP at term 37w5d


Admission Status:  Inpatient Order (span 2 midnights)


Reason for Inpatient Admission:  


L&D





Plan


Plan:  Expectant Management


Other Plan


patient desires epidural











MARLINE KYLE MD 2018 05:10

## 2018-04-19 NOTE — OB LABOR & DELIVERY RECORD
L&D History


Date of Service


Date of Service:  2018





History


Expected Date of Delivery:  May 5, 2018


Gestational Age in Weeks:  37


Hx :  1


Hx Para:  1





Pregnancy Complications


Prenatal Events:  Routine Prenatal care


Operative Indications (Cesarea:  N/A-Vaginal Delivery


Intrapartal Events:  None





L&D Stage1


Stage One


Onset of Labor - Date:  2018


Onset of Labor - Time:  22:00





Monitors and Tracing


Fetal Monitor Mode:  External


Fetal Heart Rate:  125


Fetal Station:  -1


Long Term Variability:  Average (6-10)


Short Term Variability:  Present


Fetal Presentation:  Vertex


Vital Signs





 VS - Last 72 Hours, by Label








 18





 22:30 23:15 23:21 23:25


 


Temp 98.6   


 


Pulse 87 88 101 86


 


Resp 18 18 18 18


 


B/P (MAP) 135/82 (99) 108/67 (81) 114/66 (82) 109/64 (79)


 


Pulse Ox   100 100


 


O2 Delivery Room Air Room Air Room Air Room Air


 


    





 18





 23:28 23:31 23:34 23:37


 


Pulse 87 87 89 89


 


Resp 18 18 18 18


 


B/P (MAP) 108/66 (80) 109/69 (82) 108/69 (82) 108/72 (84)


 


Pulse Ox 100 97 97 97


 


O2 Delivery Room Air Room Air Room Air Room Air





 18





 23:40 23:43 23:46 23:50


 


Temp    98.3


 


Pulse 112 96 95 94


 


Resp 18 18 18 18


 


B/P (MAP) 111/78 (89) 112/70 (84) 108/71 (83) 106/70 (82)


 


Pulse Ox 97 97 97 97


 


O2 Delivery Room Air Room Air Room Air Room Air


 


    





 18





 23:58 00:03 00:08 00:13


 


Pulse 88 98 82 85


 


Resp 18 18 18 18


 


B/P (MAP) 110/63 (79) 110/61 (77) 111/63 (79) 105/66 (79)


 


Pulse Ox 97 98 98 98


 


O2 Delivery Room Air Room Air Room Air Room Air





 18





 00:18 00:35 00:50 01:05


 


Temp   98.3 


 


Pulse 77 86 82 78


 


Resp 18 18 18 18


 


B/P (MAP) 106/73 (84) 112/68 (83) 107/61 (76) 104/59 (74)


 


Pulse Ox 98 97 98 98


 


O2 Delivery Room Air Room Air Room Air Room Air


 


    





 18





 01:20 01:35 01:50 02:05


 


Pulse 88 73 86 70


 


Resp 18 16 16 16


 


B/P (MAP) 101/65 (77) 103/66 (78) 102/61 (75) 103/61 (75)


 


Pulse Ox 98   


 


O2 Delivery Room Air Room Air Room Air Room Air





 18





 02:20 02:35 02:50 03:05


 


Temp  98.9  


 


Pulse 69 93 74 104


 


Resp 16 16 16 16


 


B/P (MAP) 97/57 (70) 96/55 (69) 105/68 (80) 102/68 (79)


 


O2 Delivery Room Air Room Air Room Air Room Air











Signs of Fetal Distress by FHT


Signs of Distress


no





Rupture of Membranes


Spontaneous Ruture of Membrane:  Yes


Amniotic Membrane Rupture Time:  2200


Amniotic Membrane Fluid Desc.:  Clear





Induction/Anesthesia


Epidural Cath Placement - Time:  2327





L&D Stage2


Stage Two


Stage II Date:  2018


Stage II Time:  04:37





Monitors and Tracing


Fetal Monitor Mode:  External


Fetal Heart Rate:  125


Fetal Monitor Accelerations:  Uniform


Fetal Monitor Decelerations:  None


Long Term Variability:  Average (6-10)


Short Term Variability:  Present


Fetal Position:  Left Occiput Anterior


Fetal Presentation:  Vertex





Signs of Fetal Distress by FHT


Signs of Distress


no





Cord Descript/Complications


Fetal Cord Vessel Description:  3 Vessels





Delivery Type


Infant Delivery Method:  Spontaneous Vaginal


Anterior Shoulder:  Left





Episiotomy/Perineal Laceration


Laceraction(s)/Extensions:  Yes


Episiotomy Description:  Midline


Sutures Used:  Vicryl





Condition of Infant


Delivery


1 minute Apgar Comment:  


9


5 minute Apgar Comment:  


9





Condition of Infant


Condition of Infant:  Living


Exam:  No Observed Abnormalities





Resuscitation


Resuscitation:  N/A - Spontaneous Resp





L&D Stage3


Stage Three


Stage III Date:  2018


Stage III Time:  04:40





Pictocin


Pitocin Administration mu/min:  12





Placenta Delivery


Placenta Delivery:  Spontaneous





Delivery Summary


Summary


Estimated blood loss (mL):  200





Condition of Delivery


Examined:  Cervix Examined


Post Partum Hemorrhage:  No


Intervention Required


none











MARLINE KYLE MD 2018 05:15

## 2018-04-20 VITALS — SYSTOLIC BLOOD PRESSURE: 133 MMHG | DIASTOLIC BLOOD PRESSURE: 79 MMHG

## 2018-04-20 VITALS — DIASTOLIC BLOOD PRESSURE: 49 MMHG | SYSTOLIC BLOOD PRESSURE: 97 MMHG

## 2018-04-20 VITALS — DIASTOLIC BLOOD PRESSURE: 58 MMHG | SYSTOLIC BLOOD PRESSURE: 98 MMHG

## 2018-04-20 LAB
BASOPHILS # BLD AUTO: 0.1 10^3/UL (ref 0–0.1)
BASOPHILS NFR BLD AUTO: 1 % (ref 0–10)
EOSINOPHIL # BLD AUTO: 0.2 10^3/UL (ref 0–0.3)
EOSINOPHIL NFR BLD AUTO: 2 % (ref 0–10)
ERYTHROCYTE [DISTWIDTH] IN BLOOD BY AUTOMATED COUNT: 13.6 % (ref 10–14.5)
HCT VFR BLD CALC: 31 % (ref 35–52)
HGB BLD-MCNC: 10.2 G/DL (ref 11.5–16)
LYMPHOCYTES # BLD AUTO: 2.9 X 10^3 (ref 1–4)
LYMPHOCYTES NFR BLD AUTO: 22 % (ref 12–44)
MANUAL DIFFERENTIAL PERFORMED BLD QL: NO
MCH RBC QN AUTO: 31 PG (ref 25–34)
MCHC RBC AUTO-ENTMCNC: 33 G/DL (ref 32–36)
MCV RBC AUTO: 93 FL (ref 80–99)
MONOCYTES # BLD AUTO: 1.1 X 10^3 (ref 0–1)
MONOCYTES NFR BLD AUTO: 9 % (ref 0–12)
NEUTROPHILS # BLD AUTO: 8.6 X 10^3 (ref 1.8–7.8)
NEUTROPHILS NFR BLD AUTO: 67 % (ref 42–75)
PLATELET # BLD: 248 10^3/UL (ref 130–400)
PMV BLD AUTO: 10.5 FL (ref 7.4–10.4)
RBC # BLD AUTO: 3.3 10^6/UL (ref 4.35–5.85)
WBC # BLD AUTO: 12.9 10^3/UL (ref 4.3–11)

## 2018-04-20 RX ADMIN — IBUPROFEN SCH MG: 600 TABLET ORAL at 08:06

## 2018-04-20 RX ADMIN — IBUPROFEN SCH MG: 600 TABLET ORAL at 01:58

## 2018-04-20 NOTE — DISCHARGE SUMMARY
Diagnosis/Chief Complaint


Date of Admission


2018 at 22:35


Date of Discharge


2018


Discharge Date:  2018


Discharge Time:  10:00


Admission Diagnosis


Admission Diagnosis


1.  Intrauterine pregnancy at 37 weeks





Discharge Diagnosis


1.  Intrauterine pregnancy at 37 weeks





Reason Hospital Visit


[facility]





Discharge Summary-OBS


Procedures


1.  Epidural per anesthesia


2.  Spontaneous vaginal delivery


3.  Repair of midline episiotomy


Discharge Physical Examination


Allergies:  


Coded Allergies:  


     divalproex sodium (Verified  Allergy, Unknown, 12/15/17)


     latex (Verified  Allergy, Unknown, 18)


     paroxetine (Verified  Allergy, Unknown, 18)


Vitals & I&Os





Vital Signs








  Date Time  Temp Pulse Resp B/P (MAP) Pulse Ox O2 Delivery O2 Flow Rate FiO2


 


18 03:53 96.9 88 16 97/49 (65)  Room Air  


 


18 23:45     97   








General Appearance:  No Acute Distress


Respiratory:  Clear to Auscultation


Cardiovascular:  Regular Rate


Abdominal:  Normal Bowel Sounds, Soft (With uterus firm)


Skin:  No Rashes





Hospital Course


Patient presented during the late p.m. of 2018 with spontaneous 

rupture of membranes.  At that time clear fluid was noted.  She was dilated to 

2 cm with a regular contraction pattern of every 5-7 minutes.  Patient received 

epidural per anesthesia.  She did not require Pitocin augmentation.  She 

continued to contract and eventually went on to completion.  She eventually 

delivered over a midline episiotomy a term viable female with Apgars of 9 at 1 

minute and 9 at 5 minutes.  She had estimated blood loss of 200 mL.  Her 

presenting hemoglobin was 11.0 with a hemoglobin day after delivery of 10.2.  

Following delivery patient had no complaints.  Her uterus was firm.  She had no 

seizure activity.  She tolerated regular diet and was ambulatory without leg 

complaints there was no shortness of breath.  Patient was felt ready for 

dismissal during the day of 2008.


Pending Labs


Laboratory Tests


18 06:14: 


White Blood Count 12.9, Red Blood Count 3.30, Hemoglobin 10.2, Hematocrit 31, 

Mean Corpuscular Volume 93, Mean Corpuscular Hemoglobin 31, Mean Corpuscular 

Hemoglobin Concent 33, Red Cell Distribution Width 13.6, Platelet Count 248, 

Mean Platelet Volume 10.5, Neutrophils (%) (Auto) 67, Lymphocytes (%) (Auto) 22

, Monocytes (%) (Auto) 9, Eosinophils (%) (Auto) 2, Basophils (%) (Auto) 1, 

Neutrophils # (Auto) 8.6, Lymphocytes # (Auto) 2.9, Monocytes # (Auto) 1.1, 

Eosinophils # (Auto) 0.2, Basophils # (Auto) 0.1





Discharge


Instructions to patient/family


Please see electronic discharge instructions given to patient.


Discharge Medications


Reviewed and agree with Discharge Medication list on patient's Discharge 

Instruction sheet





Clinical Quality Measures


DVT/VTE Risk/Contraindication:


Risk Factor Score Per Nursin


RFS Level Per Nursing on Admit:  1=Low/No VTE PPX











MARLINE KYLE MD 2018 07:24

## 2018-04-20 NOTE — DISCHARGE INST-WOMEN'S SERVICE
Discharge Inst-Women's Serv


Depart Medication/Instructions


New, Converted or Re-Newed RX:  Transmitted to Pharmacy





Consults/Follow Up


Additional Follow Up:  Yes (with Dr Kyle at [facility] in 6 weeks)





Activity


Activity:  Activity as Tolerated


Driving Instructions:  You May Drive


Nothing Inside Vagina:  No [place] (for 6 weeks)





Diet


Discharge Diet:  Regular Diet


Return to The Hospital For:  


as below


Symptoms to Report to :  Bleeding Excessive, Pain Increased, Fever Over 101 

Degrees F, Vaginal Discharge Foul


For Any Problems or Questions:  Contact Your Physician











MARLINE KYLE MD Apr 20, 2018 07:57

## 2018-06-17 ENCOUNTER — HOSPITAL ENCOUNTER (EMERGENCY)
Dept: HOSPITAL 75 - ER | Age: 23
Discharge: HOME | End: 2018-06-17
Payer: MEDICAID

## 2018-06-17 VITALS — SYSTOLIC BLOOD PRESSURE: 105 MMHG | DIASTOLIC BLOOD PRESSURE: 57 MMHG

## 2018-06-17 VITALS — HEIGHT: 63 IN | BODY MASS INDEX: 22.86 KG/M2 | WEIGHT: 129 LBS

## 2018-06-17 DIAGNOSIS — Z91.040: ICD-10-CM

## 2018-06-17 DIAGNOSIS — Z88.8: ICD-10-CM

## 2018-06-17 DIAGNOSIS — L60.0: Primary | ICD-10-CM

## 2018-06-17 DIAGNOSIS — Z88.1: ICD-10-CM

## 2018-06-17 DIAGNOSIS — F32.9: ICD-10-CM

## 2018-06-17 DIAGNOSIS — Z98.890: ICD-10-CM

## 2018-06-17 DIAGNOSIS — G40.909: ICD-10-CM

## 2018-06-17 PROCEDURE — 10060 I&D ABSCESS SIMPLE/SINGLE: CPT

## 2018-06-17 NOTE — ED INTEGUMENTARY GENERAL
General


Chief Complaint:  Skin/Wound Problems


Stated Complaint:  LEFT GREAT TOE PAIN


Nursing Triage Note:  


pt presents to ed with complaints of l big toe ingrown toel nail with pain and 


swelling.


Source:  patient, family


Exam Limitations:  no limitations





History of Present Illness


Date Seen by Provider:  Jun 17, 2018


Time Seen by Provider:  10:05


Initial Comments


This 22-year-old white female presents with an ingrown toenail that has been 

persistent through her left great toe despite attempts at home to resolve the 

issue.





Patient denies fever, ascending lymphedema, or other complaint.





Allergies and Home Medications


Allergies


Coded Allergies:  


     divalproex sodium (Verified  Allergy, Unknown, 12/15/17)


     latex (Verified  Allergy, Unknown, 4/18/18)


     paroxetine (Verified  Allergy, Unknown, 4/18/18)





Home Medications


Ibuprofen 600 Mg Tablet, 600 MG PO Q6H


   Prescribed by: MARLINE KYLE on 4/20/18 0754


Prenatal Vit No.124/Iron/FA 1 Each Tablet, 1 EACH PO DAILY, (Reported)





Patient Home Medication List


Home Medication List Reviewed:  Yes





Constitutional:  no symptoms reported


EENTM:  no symptoms reported


Respiratory:  no symptoms reported


Cardiovascular:  no symptoms reported


Gastrointestinal:  no symptoms reported


Genitourinary:  no symptoms reported


Musculoskeletal:  no symptoms reported


Skin:  see HPI, other (ingrown)


Psychiatric/Neurological:  No Symptoms Reported


Endocrine:  No Symptoms Reported


Hematologic/Lymphatic:  No Symptoms Reported





Past Medical-Social-Family Hx


Past Med/Social Hx:  Reviewed Nursing Past Med/Soc Hx


Patient Social History


Alcohol Use:  Denies Use


Recreational Drug Use:  No


Smoking Status:  Never a Smoker


Recent Foreign Travel:  No


Contact w/Someone Who Travel:  No


Recent Infectious Disease Expo:  No


Recent Hopitalizations:  No


Physical Abuse:  No


Sexual Abuse:  No


Mistreated:  No


Fear:  No





Immunizations Up To Date


Tetanus Booster (TDap):  Less than 5yrs


PED Vaccines UTD:  Yes


Date of Influenza Vaccine:  Aug 15, 2017





Seasonal Allergies


Seasonal Allergies:  Yes (roses)





Past Medical History


Surgeries:  Yes (RT KNEE SCOPE, EGD)


Orthopedic


Respiratory:  No


Cardiac:  No


Neurological:  Yes (essential tremors)


Seizure Disorder


Reproductive Disorders:  No


Genitourinary:  No


Gastrointestinal:  No


Musculoskeletal:  No


Endocrine:  Yes (hypoglycemia)


HEENT:  No


Cancer:  No


Psychosocial:  Yes


Depression


Nursing Suicide Risk Score:  0


Integumentary:  No


Blood Disorders:  No





Family Medical History





Alcoholism


  19 FATHER





Physical Exam


Vital Signs





Vital Signs - First Documented








 6/17/18





 09:10


 


Temp 98.6


 


Pulse 74


 


Resp 18


 


B/P (MAP) 96/61 (73)


 


Pulse Ox 99





Capillary Refill : Less Than 3 Seconds


General Appearance:  WD/WN, no apparent distress


HEENT:  normal ENT inspection


Neck:  normal inspection


Cardiovascular:  normal peripheral pulses, regular rate, rhythm


Respiratory:  lungs clear


Gastrointestinal:  normal bowel sounds, non tender, soft


Extremities:  normal range of motion, non-tender


Neurologic/Psychiatric:  no motor/sensory deficits, alert, normal mood/affect, 

oriented x 3


Skin:  other (ingrown right great toenail)


Skin Problem Location:  other (see above)


Skin Problem Character:  other (ingrown toenail)





Progress/Results/Core Measures


Results/Orders


Vital Signs/I&O











 6/17/18





 09:10


 


Temp 98.6


 


Pulse 74


 


Resp 18


 


B/P (MAP) 96/61 (73)


 


Pulse Ox 99














Blood Pressure Mean:  73











Progress


Progress Note :  


   Time:  10:21


Progress Note


After a discussion of treatment options and with the patient and mother's 

approval 1 percent plain Xylocaine was used for a digital block to the right 

great toe.  The patient's ingrown toenail was then excised with curved hemostat 

and iris scissors.  The redundant soft tissue adjacent to the nail was removed 

with iris scissors.  A dressing was then applied to the great toe.





Departure


Impression





 Primary Impression:  


 Ingrown toenail of right foot with infection


Disposition:  01 HOME, SELF-CARE


Condition:  Improved





Departure-Patient Inst.


Decision time for Depature:  10:22


Referrals:  


MARLINE KYLE MD (PCP/Family)


Primary Care Physician


Patient Instructions:  INGROWN TOENAIL-EXCISED





Add. Discharge Instructions:  


Vicodin for pain.  Warm soapy soaks.  Close follow-up with your doctor.  Return 

if any problems or questions.  All discharge instructions reviewed with patient 

and/or family. Voiced understanding.











CAESAR PHILLIPS MD Jun 17, 2018 10:09
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
## 2020-01-22 NOTE — DIAGNOSTIC IMAGING REPORT
Indication: Right knee pain



3 views of the right knee show no fracture, dislocation or other

acute abnormalities.



IMPRESSION: Negative right knee



Dictated by: 



  Dictated on workstation # [id number]

## 2020-11-08 NOTE — DIAGNOSTIC IMAGING REPORT
INDICATION: Trauma. Pain.



EXAMINATION: Left forearm from 11/08/2020.



FINDINGS: Two views of the forearm.



There is no evidence for an acute fracture or dislocation. The

joint spaces are well maintained. There is no significant soft

tissue swelling.



IMPRESSION: No acute process.



Dictated by: 



  Dictated on workstation # [id number]

## 2020-11-08 NOTE — DIAGNOSTIC IMAGING REPORT
INDICATION: Trauma, pain.



EXAMINATION: Left wrist from 11/08/2020.



FINDINGS: Three views of the wrist.



Very vague lucency noted in the mid pole of the scaphoid,

possibly a nutrient foramen but if there is point tenderness, a

nondisplaced fracture difficult to exclude and follow-up could

re-evaluate in 7 to 10 days. The remaining osseous structures are

intact. No dislocations.



IMPRESSION:

1. Lucency in the mid scaphoid, likely nutrient foramen, however

please see above discussion and recommendations.



Dictated by: 



  Dictated on workstation # [id number]

## 2020-11-08 NOTE — ED UPPER EXTREMITY
General


Chief Complaint:  Trauma-Non Activation


Stated Complaint:  L ARM/WRIST INJ


Nursing Triage Note:  


pt states she was going to get on her horse when the horse knocked her off 


causing her L wrist to be bent backwards, can not move fingers or wrist on L 


extremity without pain shooting all the way up her L arm


Nursing Sepsis Screen:  No Definite Risk





History of Present Illness


Date Seen by Provider:  2020


Time Seen by Provider:  11:00


Initial Comments


24-year-old  female presents for injury to her left upper extremity.  

She was getting onto her horse when she fell landing on her arms.  She denies 

any head injury or other injuries or complaints related to the fall.  She has 

not taken any medication prior to arrival.  She had a previous right knee scope.


Onset:  just prior to arrival


Pain/Injury Location:  left forearm, left wrist, left hand


Method of Injury:  fell


Modifying Factors:  Improves With Rest





Allergies and Home Medications


Allergies


Coded Allergies:  


     divalproex sodium (Verified  Allergy, Unknown, 12/15/17)


     latex (Verified  Allergy, Unknown, 18)


     paroxetine (Verified  Allergy, Unknown, 18)





Home Medications


No Active Prescriptions or Reported Meds





Patient Home Medication List


Home Medication List Reviewed:  Yes





Review of Systems


Constitutional:  no symptoms reported, see HPI


Musculoskeletal:  see HPI; No back pain; joint pain, muscle pain (Left forearm 

and wrist); No neck pain





All Other Systems Reviewed


Negative Unless Noted:  Yes





Past Medical-Social-Family Hx


Past Med/Social Hx:  Reviewed Nursing Past Med/Soc Hx


Patient Social History


Alcohol Use:  Denies Use


Recreational Drug Use:  No


2nd Hand Smoke Exposure:  No


Recent Foreign Travel:  No


Contact w/Someone Who Travel:  No


Recent Infectious Disease Expo:  No


Recent Hopitalizations:  No





Immunizations Up To Date


Tetanus Booster (TDap):  Less than 5yrs


PED Vaccines UTD:  Yes


Date of Influenza Vaccine:  Aug 15, 2017





Seasonal Allergies


Seasonal Allergies:  Yes (roses)





Past Medical History


Surgeries:  Yes (RT KNEE SCOPE, EGD)


Orthopedic


Respiratory:  No


Cardiac:  No


Neurological:  Yes (essential tremors)


Seizure Disorder


Reproductive Disorders:  No


Genitourinary:  No


Gastrointestinal:  No


Musculoskeletal:  No


Endocrine:  Yes (hypoglycemia)


HEENT:  No


Cancer:  No


Psychosocial:  Yes


Depression


Integumentary:  No


Blood Disorders:  No





Family Medical History





Alcoholism


  19 FATHER





Physical Exam


Vital Signs





Vital Signs - First Documented








 20





 10:38


 


Temp 36.7


 


Pulse 103


 


Resp 18


 


B/P (MAP) 106/70 (82)





Capillary Refill : Less Than 3 Seconds


Height, Weight, BMI


Height: 5'3.00"


Weight: 129lbs. 0.0oz. 58.005271pe; 28.00 BMI


Method:Stated


General Appearance:  WD/WN, no apparent distress


HEENT:  PERRL/EOMI, normal ENT inspection, TMs normal, pharynx normal


Neck:  non-tender, full range of motion, supple, normal inspection


Cardiovascular:  normal peripheral pulses, regular rate, rhythm


Respiratory:  chest non-tender, lungs clear, normal breath sounds


Gastrointestinal:  normal bowel sounds, non tender, soft


Back:  normal inspection, no CVA tenderness, no vertebral tenderness


Shoulder:  normal inspection, non-tender, normal ROM


Elbow/Forearm:  normal inspection, non-tender, normal ROM, Left


Wrist:  Yes normal inspection; No abrasions, No asymmetry; Yes bone tenderness 

(Generalized, left wrist.  Trace tenderness at snuffbox.); No deformity; Yes 

limited ROM (Lacking full range of motion secondary to pain.), Yes pain, Yes 

soft tissue tenderness; No swelling


Hand:  normal inspection, soft tissue tenderness (Generalized left hand and 

fingers)


Neurologic/Psychiatric:  no motor/sensory deficits, alert, normal mood/affect, 

oriented x 3


Skin:  normal color, warm/dry; No ecchymosis





Progress/Results/Core Measures


Results/Orders


My Orders





Orders - JUDY HAY


Tramadol Tablet (Ultram Tablet) (20 11:51)





Vital Signs/I&O











 20





 10:38


 


Temp 36.7


 


Pulse 103


 


Resp 18


 


B/P (MAP) 106/70 (82)








2





Blood Pressure Mean:                    82











Progress


Progress Note :  


   Time:  11:00


Progress Note


There I do not know with the overall patient seen and evaluated, will obtain x-

ray of the left forearm and elbow.


1140 x-ray shows possible lucency in the left scaphoid otherwise no fractures 

noted on elbow or forearm.  Since patient has tenderness in the snuffbox we will

use a thumb spica splint and have her follow-up with orthopedics.  Tramadol 50 

mg orally for pain.  Discharge instructions and return precautions reviewed with

the patient.  All questions answered.





Diagnostic Imaging





   Diagonstic Imaging:  Xray


   Plain Films/CT/US/NM/MRI:  forearm


Comments


NAME:   DEMARIO PRICE


MED REC#:   [id number]


ACCOUNT#:   [id number]


PT STATUS:   REG ER


:   1995


PHYSICIAN:   SWAPNIL MUSTAFA MD


ADMIT DATE:   20/ER


                                  ***Signed***


Date of Exam:20





FOREARM, LEFT, 2 VIEWS








INDICATION: Trauma. Pain.





EXAMINATION: Left forearm from 2020.





FINDINGS: Two views of the forearm.





There is no evidence for an acute fracture or dislocation. The


joint spaces are well maintained. There is no significant soft


tissue swelling.





IMPRESSION: No acute process.





Dictated by: 





  Dictated on workstation # [id number]








Dict:   20 1111


Trans:   20 1134


AS6 [id number]





Interpreted by:     MARE HEADLEY MD


Electronically signed by: MARE HEADLEY MD 20 1134


   Reviewed:  Reviewed by Me








   Diagonstic Imaging:  Xray


   Plain Films/CT/US/NM/MRI:  other (writst)


Comments


                                [place], [place]





NAME:   DEMARIO PRICE


MED REC#:   [id number]


ACCOUNT#:   [id number]


PT STATUS:   REG ER


:   1995


PHYSICIAN:   SWAPNIL MUSTAFA MD


ADMIT DATE:   20/ER


                                  ***Signed***


Date of Exam:20





WRIST, LEFT, 3 VIEWS OR MORE








INDICATION: Trauma, pain.





EXAMINATION: Left wrist from 2020.





FINDINGS: Three views of the wrist.





Very vague lucency noted in the mid pole of the scaphoid,


possibly a nutrient foramen but if there is point tenderness, a


nondisplaced fracture difficult to exclude and follow-up could


re-evaluate in 7 to 10 days. The remaining osseous structures are


intact. No dislocations.





IMPRESSION:


1. Lucency in the mid scaphoid, likely nutrient foramen, however


please see above discussion and recommendations.





Dictated by: 





  Dictated on workstation # [id number]








Dict:   20 1110


Trans:   20 1134


AS6 [id number]





Interpreted by:     MARE HEADLEY MD


Electronically signed by: MARE HEADLEY MD 20 1134


   Reviewed:  Reviewed by Me





Departure


Impression





   Primary Impression:  


   Fall


   Qualified Codes:  W19.XXXA - Unspecified fall, initial encounter


   Additional Impressions:  


   Contusion of left arm


   Qualified Codes:  S40.022A - Contusion of left upper arm, initial encounter


   Scaphoid fracture


   Qualified Codes:  S62.025A - Nondisplaced fracture of middle third of 

   navicular [scaphoid] bone of left wrist, initial encounter for closed 

   fracture


Disposition:   HOME, SELF-CARE


Condition:  Improved





Departure-Patient Inst.


Decision time for Depature:  11:40


Referrals:  


MARLINE KYLE MD (PCP/Family)


Primary Care Physician








TANYA PACKER MD


Patient Instructions:  Contusion (DC), Wrist Fracture (DC)





Add. Discharge Instructions:  


Wear wrist splint at all times, may remove to shower. 


Ice and elevated left arm, 20 min every 2 hours. 


Alternate Ibuprofen 600 mg and Tylenol 650 mg every 4 hours. 


Schedule appt with Dr. Packer for 1 week. There may be a fracture in your left 

scaphoid but a follow up x-ray will be necessary to confirm. 


Return to Emergency Dept for new, urgent health care needs. 





All discharge instructions reviewed with patient and/or family. Voiced 

understanding.


Scripts


No Active Prescriptions or Reported Meds





Copy


Copies To 1:   TANYA PACKER MD, AMY ARNP                   2020 11:47

## 2020-12-09 NOTE — DIAGNOSTIC IMAGING REPORT
INDICATION: Left ankle fracture.



FINDINGS: 45.8 seconds of fluoroscopy was utilized by Dr. Schwab

during open reduction and internal fixation of the patient's

previously demonstrated left ankle fractures. There has been

placement of a plate and screws along the fibula as well as

fixation of the patient's medial malleolar fracture with screws.

There has been placement of two lower syndesmotic screws.



IMPRESSION: Left ankle open reduction internal fixation.

Postsurgical reduction appears anatomic. 



Dictated by: 



  Dictated on workstation # [id number]

## 2020-12-09 NOTE — OPERATIVE REPORT - ORTHO
Operative Report


Surgeon (s)/Assistant (s)


Surgeon


TERRY SCHWAB MD


Assistant


n/a





Pre-Operative Diagnosis


trimalleolar fracture left ankle with syndesmosis disruption





Post-Operative Diagnosis


same





Operative Report


Date of Procedure:  Dec 9, 2020


Name of Procedure Performed:  


Open reduction internal fixation of trimalleolar fracture left ankle


Description & Findings


The patient was seen preoperatively and treatment options were discussed with th

e patient.  She would like to proceed with surgical treatment.  Surgical 

treatment was discussed and she has no further questions.


The patient was taken to the operating room and placed on the OR table.  After 

general anesthesia tourniquet was placed on the left thigh.  The splint was 

removed from the left foot and ankle and no skin abrasions or wounds were noted.

 She had good cap refill and good pulses.  She was given 2 g of Ancef IV in the 

OR.  Timeout was performed.  At this point the left foot and lower leg are 

prepped and draped in the usual sterile manner.  The tourniquet was inflated did

250 mmHg after exsanguination of the lower leg with an Esmarch.


Incision was placed laterally over the distal fibula.  This was taken down 

through subjacent tissue.  Bleeders are cauterized.  The fascia was split 

overlying the distal fibula and the fracture was noted.  It was a short oblique 

fracture.  It could be easily reduced and stayed in place after reduction.  This

point a 10 hole plate was fashioned to fit the distal fibula.  A screw was 

placed just proximal to the fracture.  X-rays showed that the plate was too long

distally so this was replaced with an 8 hole plate was fashioned to fit the 

distal fibula.  This was appropriate length.  A second screw was placed just 

distal to the fracture site.  Image was used again visualize the fracture and 

plate position and good alignment and plate position was noted.  This 0.2 

additional screws were placed proximally all 3 bicortical and one just distal to

the fracture bicortical as well.  No further screws were placed distally as 

syndesmosis screws were going to be used after fixation medially.


At this point incision was made over the medial malleolus.  This was taken out 

through subjacent tissue.  The saphenous vein was retracted anteriorly.  The 

fracture was irrigated and debrided and no loose bodies were noted.  There was a

small fragment that went from superior anterior to anterior posterior.  This 

could be easily reduced and held with a reduction clamp.  This point guidewires 

were placed through the medial malleolar fracture cross fracture line in the 

distal tibia.  Mrs. used to check position of the guidewires which were within 

the medial malleolus on the AP and mortise view.  At this point the medial 

malleolar screws were placed over the guidewires after drilling.  Both were 30 

mm partially threaded 4.0 cancellus screws.  This provided good fixation of the 

medial malleolus.  Again the ankle was checked with fluoroscopy and the fracture

was reduced anatomically both medial and laterally.  No widening of the clear 

space medially.  The mortise was symmetrical and no widening of the syndesmosis.

 Again there was noted be instability at the syndesmosis.  At this 0.2 

syndesmotic screws were placed proximally a centimeter and a half above the 

joint surface and then the next screw hole proximally.  These were 4 cortical 

screws.  The syndesmosis was held reduced as the fibula and tibia were drilled. 

50 mm cortical screws were inserted which held the syndesmosis reduced.  The 

ankle was then stressed under fluoroscopy and no widening of syndesmosis or 

medial clear space was noted.  Permanent x-rays were obtained AP, lateral and 

mortise views.  Excellent alignment was noted and excellent position of plate 

and screws.  The posterior malleolar fracture was a very thin piece that reduced

well without fixation.


At this point the tourniquet was deflated after 44 minutes.  There was minimal 

bleeding.  Both wounds are irrigated with normal saline.  Small veins are 

cauterized.  The soft tissue overlying the distal fibula was closed with 0 

Vicryl for the muscular layer then 20 for subcutaneous tissue.  The skin was the

n closed with staples.  Medially the wound was irrigated and the subcutaneous 

tissues closed with 2-0 Vicryl and the skin with skin clips.  Both wounds were 

injected with 0.5 percent Marcaine and 1 percent Xylocaine with epinephrine 5050

mixture.  40 mL's total.  Approximately 30 lateral and 10 medially.  Was 

redressed with antibiotic ointment, Adaptic and 4 x 4's and wrapped with web 

roll.  Web roll from the tips of the toes to the upper or lower leg.  Posterior 

and sugar tong splints were then applied which were wrapped with Ace wraps.  The

ankle was held at 90 while the splint hardened.  Patient had a good dorsalis 

pedis pulse and good capillary refill after the procedure.  Patient was then 

transferred to recovery in good condition talked procedure well





Plan on observation overnight for pain medication and IV antibiotics.  Plan on 

discharge tomorrow morning.





Total blood loss50 mL


Replacementnone


Complicationsnone


Tourniquet time 44 minutes at 250 mmHg





n/a


Anesthesia Type


Gen.


Estimated Blood Loss


50 mL


Packing


none.


Specimen(s) collected/removed


None











SCHWAB,TERRY D MD               Dec 9, 2020 21:31
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
## 2020-12-09 NOTE — DIAGNOSTIC IMAGING REPORT
INDICATION: Left ankle injury.



TIME OF EXAM: 02:46 p.m.



TECHNIQUE: Three views of the left ankle were obtained.



FINDINGS: There is a fracture of the suprasyndesmotic portion of

the distal fibula with very minimal lateral displacement of the

distal fibular fracture fragment. There is also a transversely

oriented fracture through the medial malleolus. There also

appears to be a posterior malleolar fracture of the distal tibia.

There may be some slight widening of the medial clear space of

the ankle mortise. No other fractures are seen.



IMPRESSION: Suprasyndesmotic distal fibular fracture with medial

and posterior malleolar tibial fractures, as described.



Dictated by: 



  Dictated on workstation # [id number]

## 2020-12-09 NOTE — NUR
PT to floor at this time. no nausea, no pain at this time, report received from Olivia from 
recovery. stated Dr. Schwab was very pleased with how well pt tolerated surgery. pt in on 
the phone currently updating mother/family post surgery. Lt foot noted to be in a split cast 
with gauze and ace wrap covering. dressing remaining in place at this time incision not 
assessed. pt awake and a&o at this time. will continue to monitor for changes.
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
## 2020-12-09 NOTE — DIAGNOSTIC IMAGING REPORT
PROCEDURE: CT cervical spine without contrast.



TECHNIQUE: Multiple contiguous axial images were obtained through

the cervical spine without the use of intravenous contrast.

Sagittal and coronal reformations were then performed. Auto

Exposure Controls were utilized during the CT exam to meet ALARA

standards for radiation dose reduction. 



INDICATION: Fall with posterior neck pain.



FINDINGS: Curvature and alignment of the cervical spine is

normal. No fracture or subluxation is identified. Prevertebral

tissues are within normal limits. Odontoid is intact.



IMPRESSION: No acute bony abnormality is detected.



Dictated by: 



  Dictated on workstation # [id number]

## 2020-12-10 NOTE — NUR
SPOKE WITH THE PT TO COMPLETE THE MED REC



PT DENIES TAKING ANY PRESCRIPTION MEDICATIONS



OTC MEDS:

IBUPROFEN

HAIR,SKIN AND NAILS

## 2020-12-10 NOTE — ANESTHESIA-GENERAL POST-OP
General


Patient Condition


Mental Status/LOC:  Same as Preop


Cardiovascular:  Satisfactory


Nausea/Vomiting:  Absent


Respiratory:  Satisfactory


Pain:  Controlled


Complications:  Absent





Post Op Complications


Complications


None





Follow Up Care/Instructions


Patient Instructions


None needed.





Anesthesia/Patient Condition


Patient Condition


Patient was seen this morning in post-op rounds and she was doing well, no 

complaints, stable vital signs, no apparent adverse anesthesia problems. She is 

already discharged to home.











NICKI PUTNAM DO         Dec 10, 2020 14:23

## 2020-12-10 NOTE — PROGRESS NOTE - ORTHO
Progress Note


Subjective


Date of Exam


12/10/20


Chief Complaint


POD#1 open reduction internal fixation trimalleolar fracture left ankle


HPI/Events since last exam


Claudia is doing well first postop day.  Minimal to mild pain.  Hydrocodone for 

pain is working well.  She's been up ambulating with crutches nonweightbearing 

on the left without any problems


Review of Systems


Reviewed and no additions or changes


Allergies:  


Coded Allergies:  


     divalproex sodium (Verified  Allergy, Unknown, 12/15/17)


     latex (Verified  Allergy, Unknown, 18)


     paroxetine (Verified  Allergy, Unknown, 18)


Home Meds


Active Scripts


Hydrocodone/Acetaminophen (Hydrocodone-Acetamin 5-325 mg) 1 Each Tablet, 1 TAB 

PO Q4H PRN for PAIN-MODERATE (5-7), #40 TAB


   Prov:SCHWAB,TERRY D MD         12/10/20


Reported Medications


Multivit-Min/Folic Acid/Biotin (Hair, Skin & Nails Caplet) 1 Each Tablet, 1 EACH

PO DAILY, TAB


   12/10/20


Ibuprofen (Ibuprofen) 200 Mg Tablet, 400-600 MG PO Q8H PRN for PAIN-MILD (1-4), 

TAB


   12/10/20





Objective


Exam


Constitutional: []


HEENT: []


Neck: []


Cardiovascular: []


Respiratory: []


Gastrointestinal: []


Genitourinary: []


Skin: []


Back/Spine: []


Extremities: Normal sensation to the toes.  Good capillary refill.  She move her

 toes without pain.  Splint is intact.  []


Neurologic: []


Psychiatric: []


Hematologic/lymphatic/immunologic: []


Vital Signs





Vital Signs








  Date Time  Temp Pulse Resp B/P (MAP) Pulse Ox O2 Delivery O2 Flow Rate FiO2


 


12/10/20 08:00 37.0 103 14 100/58 (72) 95 Room Air  


 


12/10/20 03:57 37.2 93 18 102/58 (73) 96 Room Air  


 


12/10/20 00:11 37.2 123 22 118/56 (76) 96 Room Air  


 


20 21:55     99 Room Air  


 


20 21:54 36.7 102 16 105/66 (79) 99 Room Air  


 


20 21:50 36.3  20 114/67 (83) 98 Room Air  


 


20 21:50      Room Air  


 


20 21:45      Room Air  


 


20 21:40   20 110/69 (83) 98 Room Air  


 


20 21:30      Room Air  


 


20 21:30   20 110/67 (81) 99 Room Air  


 


20 21:20   20 113/69 (84) 100 OxyMask 3 


 


20 21:15      OxyMask 3 


 


20 21:10   20 113/68 (83) 100 OxyMask 4 


 


20 21:00   20 103/57 (72) 100 OxyMask 6 


 


20 21:00      OxyMask 6 


 


20 20:52      OxyMask 6 


 


20 20:52 36.3  20 108/70 (83) 100 OxyMask 6 


 


20 19:28        


 


20 18:20     98 Room Air  


 


20 16:56 37.5 97 18 121/74 (90) 98 Room Air  


 


20 16:52 37.5 97 18 121/74 98 Room Air  


 


20 16:45  90 16 113/62 98   


 


20 14:15 35.9 86 16 107/71 (83) 97   














I & O 


 


 12/10/20





 07:00


 


Intake Total 1640 ml


 


Balance 1640 ml








Lab Results


Laboratory Tests


20 19:10: Coronavirus  ([facility]) Negative





Assessment and Plan


Assessment


Doing well first postop day


Problem List


Unchanged


Plan


Patient wants to go home.  She has 1 more dose of Ancef that we'll give her 

early.  She'll then be discharged after that.  She's been up ambulating with 

crutches nonweightbearing without any problems.


Prescription for hydrocodone 5/325 number 40 to take 1-2 every 4 hours when 

necessary pain.  Continue ice and elevation.  Follow-up in the office on 


Aspirin 325 mg daily for DVT prophylaxis





Final Diagonsis


Trimalleolar fracture left ankle with syndesmosis disruption


Level of the visit:  Level 3





Clinical Quality Measures


DVT/VTE Risk/Contraindication:


Risk Factor Score Per Nursin


RFS Level Per Nursing on Admit:  4+=Very High











SCHWAB,TERRY D MD              Dec 10, 2020 09:37

## 2020-12-10 NOTE — PHYSICAL THERAPY ORTHO EVAL
PT Orthopedic Evaluation


Type of Surgery


left ankle fracture with repair





Prior Level of Function


Current Living Status:  Other Family


Locomotion     (Upon Admit):  Independent





Subjective


Subjective


Patient agrees to PT.  She reports she has used crutches in the past but does 

not have any.


Entry Into Home:  Stairs With Railing


Steps Into Home:  2





Motor Control


Motor Control:  Motor Control WNL





ROM


ROM:  WFL, except focal deficit





Strength


Strength:  WFL





Transfer


SCALE: Activities may be completed with or without assistive devices.





6-Indepedent-patient completes the activity by him/herself with no assistance 

from a helper.


5-Set-up or Clean-up Assistance-helper sets up or cleans up; patient completes 

activity. [place] assists only prior to or  


    following the activity.


4-Supervision or Touching Assistance-helper provides verbal cues and/or touchi

ng/steadying and/or contact guard assistance as patient completes activity. 

Assistance may be provided   


    throughout the activity or intermittently.


3-Partial/Moderate Assistance-helper does LESS THAN HALF the effort. [place] 

lifts, holds or supports trunk or limbs, but provides less than half the effort.


2-Substantial/Maximal Assistance-helper does MORE THAN HALF the effort. [place] 

lifts or holds trunk or limbs and provides more than half the effort.


7-Rbphhzbrf-okiodk does ALL the effort. Patient does none of the effort to 

complete the activity. Or, the assistance of 2 or more helpers is required for 

the patient to complete the  


    activity.


If activity was not attempted, code reason:


7-Patient Refused.


9-Not Applicable-not attempted and the patient did not perform the activity 

before the current illness, exacerbation or injury.


10-Not Attempted due to Environmental Limitations-(lack of equipment, weather 

restraints, etc.).


88-Not Attempted due to Medical Conditions or Safety Concerns.


Transfers (B, C, W/C) (QC):  5





Gait


Gait Assistive Device:  Crutches


Right Lower Extremity:  Right


Weight Bearing Status RLE:  Full Weight Bearing


Left Lower Extremity:  Left


Weight Bearing Status LLE:  Non Weight Bearing


Gait (QC):  4


Distance (QC):  3=150 ft


Distance:  200'


Gait Level of Assist:  4 (for safety)





Treatment Rendered


Treatment:  Gait Train, Step Train





Assessment/Goals


Goal Time Frame:  1 Visit


Safe Ambulation:  Yes





Plan


Treatment Plan:  Discharge


PT/Family Agrees to Plan:  Yes





Time


Time In:  845


Time Out:  858


Total Billed Treatment Time:  13


Billed Treatment Time


1 visit


EVLow 13 min











MORRIS GOTTLIEB PT              Dec 10, 2020 10:04

## 2020-12-10 NOTE — SHORT STAY SUMMARY
Discharge Summary


Hospital Course


Was the Problem List Reviewed?:  Yes


Final Diagnosis:  trimalleolar fracture left ankle


Hospital Course


Date of Admission: Dec 9, 2020 at 15:44 


Admission Diagnosis :  


Trimalleolar fracture left ankleclosed


Family Physician/Provider: Teo Mercado MD  





Date of Discharge: 12/10/20 


Discharge Diagnosis:  Trimalleolar fracture left ankleclosed]








Hospital Course: The patient injured her left ankle when she was thrown from her

horse.  She was seen in the emergency room and x-rays showed a fracture of the 

left ankle, trimalleolar, closed.  She was splinted and scheduled for surgery.  

I saw the patient on the floor discuss treatment options.  I recommended 

surgical treatment she would like to proceed.  We discussed the procedure and 

she has no questions or concerns.


The patient was taken operating room and under general anesthesia and an open 

reduction internal fixation of left ankle fracture.  Fibula was fixed with a 

semitubular plate, the medial malleolus was fixed with 2 screws and the 

syndesmosis was stabilized with 2 second syndesmotic screws.  The posterior 

malleolar was a small fragment that reduced well after fixation of the above


Postop day number 1 the patient was afebrile.  She had been up walking with cru

tches nonweightbearing on the left and having minimal to no pain.  She's been 

taking hydrocodone without any problems.


She is ready for discharge.  Continue with the hydrocodone and was prescribed 

number 40 which she can take 1-2 every 4 hours when necessary pain.  Continue 

ice and elevation.  Continue crutch ambulation nonweightbearing on the left.  I 

also recommended she take aspirin 325 mg once a day 4 weeks for DVT 

prophylaxis.  She received Ancef 2 g IV preop and also 2 doses postop prior to 

discharge


[ ]














Labs and Pending Lab Test:


Laboratory Tests


20 19:10: Coronavirus 2019 ([facility]) Negative





Home Meds


Active


Reported


Hair, Skin & Nails Caplet (Multivit-Min/Folic Acid/Biotin) 1 Each Tablet 1 Each 

PO DAILY


Ibuprofen 200 Mg Tablet 400-600 Mg PO Q8H PRN


Assessment/Pt Instructions


Crutch ambulation, nonweightbearing on the left.  Continue ice and elevation.  

Hydrocodone for pain.  Aspirin 325 mg once a day for DVT prophylaxis.  Follow-up

appointment on 





Discharge Instructions


Discharge Diet:  No Restrictions


Activity as Tolerated:  No





Discharge Physical Examination


Allergies:  


Coded Allergies:  


     divalproex sodium (Verified  Allergy, Unknown, 12/15/17)


     latex (Verified  Allergy, Unknown, 18)


     paroxetine (Verified  Allergy, Unknown, 18)





Discharge Summary


Date of Admission


Dec 9, 2020 at 15:44


Date of Discharge








Clinical Quality Measures


DVT/VTE Risk/Contraindication:


Risk Factor Score Per Nursin


RFS Level Per Nursing on Admit:  4+=Very High











SCHWAB,TERRY D MD              Dec 10, 2020 09:34

## 2020-12-28 NOTE — DIAGNOSTIC IMAGING REPORT
INDICATION: 

Bimalleolar ankle fracture.



TECHNIQUE: 

AP, oblique, and lateral views of the left ankle were obtained.



FINDINGS:

Since 12/09/2020, there has been internal fixation of the distal

fibular shaft fracture with lateral fibular plate and syndesmotic

screws. There is mild residual offset at the distal fibular shaft

fracture site. There is good reduction of the medial malleolar

fracture with two partially threaded cannulated screws in place.

There is minimal offset and cortical irregularity of the

articular surface along the medial malleolus. No new fracture or

malalignment is identified.



IMPRESSION: 

Good partial reduction of the bimalleolar fractures without

complication related to ankle hardware.



Dictated by: 



  Dictated on workstation # [id number]

## 2021-01-08 NOTE — DIAGNOSTIC IMAGING REPORT
INDICATION: Follow-up ankle fractures.



TIME OF EXAM: 02:13 p.m.



COMPARISON: Correlation is made with prior radiographs of

12/28/2020.



FINDINGS: Three views of the left ankle demonstrate lateral plate

and numerous screws transfixing the distal fibular fracture.

There are two fully threaded syndesmotic screws. There are also

two partially-threaded screws transfixing the transversely

oriented medial malleolar fracture. Ankle mortise is well

maintained. Talar dome is smooth. Fracture lines remain clearly

visible. Alignment is anatomic.



IMPRESSION: Satisfactory postoperative changes to the left ankle

fractures, as described. Fracture lines remain clearly visible.



Dictated by: 



  Dictated on workstation # [id number]

## 2023-04-23 NOTE — DIAGNOSTIC IMAGING REPORT
CLINICAL INDICATION: Patient with headache, dizziness and sharp

pain to back of head. The pain has been going on for 2 weeks

since getting a concussion. Patient has pain all over neck worse

on the left side.



Exam: Head CT without IV contrast with sagittal and coronal

reformations. Axial CT scan of the cervical spine with sagittal

and coronal reformations. Auto Exposure Controls were utilized

during the CT exam to meet ALARA standards for radiation dose

reduction.



Comparison: CT scan the cervical spine without contrast dated

12/9/2020.



Findings:



Head CT:

There is no evidence of acute cerebral infarct, intracranial

hemorrhage, or gross mass effect. 



The brain parenchymal volume appears appropriate for patient's

age. There is normal gray-white matter distinction.  There is no

significant midline shift or herniation. 



 There is no evidence of hydrocephalus. The basal cisterns are

unremarkable. The skull, extracranial soft tissue, and orbits are

unremarkable. The paranasal sinuses are unremarkable. Temporal

bones show no significant abnormality.



Cervical spine:

No acute cervical spine fracture or dislocation. There is

straightening of the cervical spine posture which is nonspecific.

There is no prevertebral soft tissue swelling. Possible small

anterior disk bulge at the C3-C4 and C4-C5 levels. There is  no

significant neck soft tissue abnormality. Visualized upper lung

fields are clear.



Impression:



1: There is no evidence of acute intracranial process. There is

no skull fracture. There is no intracranial hemorrhage.



2: There is no acute cervical spine fracture.



3: There is straightening of the cervical spine posture which is

nonspecific but may be seen with patient positioning or muscle

spasms.



4: There appears to be small anterior disk bulges at the C3-C4

and C4-C5 levels.



Dictated by: 



  Dictated on workstation # [id number]

## 2023-04-23 NOTE — ED HEAD INJURY
General


Chief Complaint:  Head/Cervical Problems


Stated Complaint:  DIZZINESS/LOSS OF EYE SIGHT


Nursing Triage Note:  


PT AMB TO [facility] WITH C/O HERNANDEZ, DIZZY AND SHARP PAIN TO BACK OF HEAD. PAIN HAS BEEN 


GOING ON FOR 2 WEEKS SINCE GETTING A CONCUSSION. PT HAS NOT SEEN ANY DOCTOR OR 


TAKEN ANY MEDICATION TO HELP WITH THE PAIN 


 (JUDY HAY)





History of Present Illness


Date Seen by Provider:  2023


Time Seen by Provider:  11:50


Initial Comments


27-year-old  female presents for headache and neck pain that is been 

present for the last 2 weeks.  She reports she was getting into her grandfathe

[employer] truck when she struck the top of her head causing immediate onset of pain, 

vision changes intermittently, nausea, and weakness.  She has had a concussion 

in the past after being thrown off a horse, with associated lumbar spine 

fractures.  She denies any neck injuries in the past but does report slight 

decrease in range of motion with her neck and occasional neck pain.  She tried 

Tylenol and ibuprofen immediately after the head injury and had minimal 

improvement then she ran out.  She has not been seen by her primary care 

provider since this injury.  She recently moved back to [place] and has not 

established but has appt at [facility] in mid-May.


Occurred:  other (2 weeks ago)


Location:  frontal


Method of Injury:  direct blow


Loss of Consciousness:  no loss of consciousness


Associated Systoms:  No Chest Pain, No Cough, No Diaphoresis, No Fever/Chills; 

Headaches; No Loss of Appetite; Malaise, Nausea/Vomiting; No Rash, No Seizure, 

No Shortness of Air, No Syncope, No Weakness (JUDY HAY)





Allergies and Home Medications


Allergies


Coded Allergies:  


     divalproex sodium (Verified  Allergy, Unknown, 12/15/17)


     latex (Verified  Allergy, Unknown, 18)


     paroxetine (Verified  Allergy, Unknown, 18)





Patient Home Medication List


Home Medication List Reviewed:  Yes


 (JUDY HAY)


Hydrocodone/Acetaminophen (Hydrocodone-Acetamin 5-325 mg) 1 Each Tablet, 1 TAB 

PO Q4H PRN for PAIN-MODERATE (5-7)


   Prescribed by: TERRY D SCHWAB MD on 12/10/20 0930


Ibuprofen (Ibuprofen) 200 Mg Tablet, 400-600 MG PO Q8H PRN for PAIN-MILD (1-4), 

(Reported)


   Entered as Reported by: ASIF FERRARO on 12/10/20 0914


Multivit-Min/Folic Acid/Biotin (Hair, Skin & Nails Caplet) 1 Each Tablet, 1 EACH

PO DAILY, (Reported)


   Entered as Reported by: ASIF FERRARO on 12/10/20 0914


Ondansetron (Ondansetron Odt) 4 Mg Tab.rapdis, 4 MG PO Q6H PRN for NA

USEA/VOMITING


   Prescribed by: JUDY HAY on 23 1341





Review of Systems


Review of Systems


Constitutional:  see HPI


Gastrointestinal:  see HPI; No abdominal pain, No constipation, No diarrhea; 

nausea; No vomiting


Psychiatric/Neurological:  See HPI, Headache (JUDY HAY)





All Other Systems Reviewed


Negative Unless Noted:  Yes


 (JUDY HAY)





Past Medical-Social-Family Hx


Patient Social History


Tobacco Use?:  No


Use of E-Cig and/or Vaping dev:  No


Substance use?:  No


Alcohol Use?:  No


Pt feels they are or have been:  No


 (JUDY HAY)





Immunizations Up To Date


Tetanus Booster (TDap):  Less than 5yrs


PED Vaccines UTD:  Yes


Influenza Vaccine Up-to-Date:  No; Not Current


First/Initial COVID19 Vaccinat:  YES


Second COVID19 Vaccination Geoffrey:  YES


 (JUDY HAY)





Seasonal Allergies


Seasonal Allergies:  Yes (roses)


 (UJDY HAY)





Past Medical History


Surgery/Hospitalization HX:  


DENIES


Surgeries:  Yes (RT KNEE SCOPE, EGD)


Orthopedic


Respiratory:  Yes


Asthma


Currently Using CPAP:  No


Currently Using BIPAP:  No


Cardiac:  No


Neurological:  Yes (essential tremors)


Seizure Disorder


Last Menstrual Period:  Apr 10, 2023


Reproductive Disorders:  No


Genitourinary:  No


Gastrointestinal:  No


Musculoskeletal:  No


Endocrine:  Yes (hypoglycemia)


HEENT:  No


Cancer:  No


Psychosocial:  Yes


Depression


Integumentary:  No


Blood Disorders:  No


 (JUDY HAY)





Family Medical History


Reviewed Nursing Family Hx


 (JUDY HAY)





Alcoholism


  20 FATHER





Physical Exam


Vital Signs





Vital Signs - First Documented








 23





 10:27 14:01


 


Temp 37.0 


 


Pulse 78 


 


Resp 16 


 


B/P (MAP) 121/79 (93) 


 


Pulse Ox  100


 


O2 Delivery  Room Air








 (BRUEGGEMANN,CELI T MD)


Vital Signs


Capillary Refill :  


 (BHARTI,JUDY ARNRAMIREZ)


Height, Weight, BMI


Height: 5'3.00"


Weight: 129lbs. 0.0oz. 58.445197hx; 31.64 BMI


Method:Stated


General Appearance:  WD/WN, no apparent distress


HEENT:  PERRL/EOMI, normal ENT inspection, TMs normal, pharynx normal, 

photophobia


Neck:  non-tender, full range of motion, supple, normal inspection, tender 

lateral (bilat with ROM and palpation); No tender midline


Cardiovascular:  normal peripheral pulses, regular rate, rhythm


Respiratory:  chest non-tender, lungs clear, normal breath sounds


Gastrointestinal:  normal bowel sounds, non tender, soft


Back:  normal inspection, no CVA tenderness, no vertebral tenderness


Extremities:  normal range of motion, non-tender, normal inspection, no pedal 

edema, normal capillary refill


Psychiatric:  alert, oriented x 3; No depressed affect


Crainal Nerves:  normal hearing, normal speech


Coordination/Gait:  normal finger to nose, normal gait


Motor/Sensory:  no motor deficit, no sensory deficit


Skin:  normal color, warm/dry (BHARTIJUDY)





Progress/Results/Core Measures


Results/Orders


Lab Results





Laboratory Tests








Test


 23


12:26 23


12:50 Range/Units


 


 


Urine Color YELLOW    


 


Urine Clarity SL CLOUDY    


 


Urine pH 6.5   5-9  


 


Urine Specific Gravity 1.015 L  1.016-1.022  


 


Urine Protein NEGATIVE   NEGATIVE  


 


Urine Glucose (UA) NEGATIVE   NEGATIVE  


 


Urine Ketones 3+ H  NEGATIVE  


 


Urine Nitrite NEGATIVE   NEGATIVE  


 


Urine Bilirubin 1+ H  NEGATIVE  


 


Urine Urobilinogen 1.0   < = 1.0  MG/DL


 


Urine Leukocyte Esterase 1+ H  NEGATIVE  


 


Urine RBC (Auto) 2+ H  NEGATIVE  


 


Urine RBC 2-5 H   /HPF


 


Urine WBC 2-5    /HPF


 


Urine Squamous Epithelial


Cells 5-10 


 


  /HPF





 


Urine Crystals NONE    /LPF


 


Urine Bacteria NEGATIVE    /HPF


 


Urine Casts NONE    /LPF


 


Urine Mucus NEGATIVE    /LPF


 


Urine Culture Indicated NO    


 


Urine Opiates Screen NEGATIVE   NEGATIVE  


 


Urine Oxycodone Screen NEGATIVE   NEGATIVE  


 


Urine Methadone Screen NEGATIVE   NEGATIVE  


 


Urine Propoxyphene Screen NEGATIVE   NEGATIVE  


 


Urine Barbiturates Screen NEGATIVE   NEGATIVE  


 


Ur Tricyclic Antidepressants


Screen NEGATIVE 


 


 NEGATIVE  





 


Urine Phencyclidine Screen NEGATIVE   NEGATIVE  


 


Urine Amphetamines Screen NEGATIVE   NEGATIVE  


 


Urine Methamphetamines Screen NEGATIVE   NEGATIVE  


 


Urine Benzodiazepines Screen NEGATIVE   NEGATIVE  


 


Urine Cocaine Screen NEGATIVE   NEGATIVE  


 


Urine Cannabinoids Screen NEGATIVE   NEGATIVE  


 


White Blood Count


 


 7.3 


 4.3-11.0


10^3/uL


 


Red Blood Count


 


 4.47 


 3.80-5.11


10^6/uL


 


Hemoglobin  14.1  11.5-16.0  g/dL


 


Hematocrit  41  35-52  %


 


Mean Corpuscular Volume  91  80-99  fL


 


Mean Corpuscular Hemoglobin  32  25-34  pg


 


Mean Corpuscular Hemoglobin


Concent 


 35 


 32-36  g/dL





 


Red Cell Distribution Width  12.1  10.0-14.5  %


 


Platelet Count


 


 408 H


 130-400


10^3/uL


 


Mean Platelet Volume  9.3  9.0-12.2  fL


 


Immature Granulocyte % (Auto)  0   %


 


Neutrophils (%) (Auto)  50  42-75  %


 


Lymphocytes (%) (Auto)  39  12-44  %


 


Monocytes (%) (Auto)  8  0-12  %


 


Eosinophils (%) (Auto)  1  0-10  %


 


Basophils (%) (Auto)  1  0-10  %


 


Neutrophils # (Auto)


 


 3.7 


 1.8-7.8


10^3/uL


 


Lymphocytes # (Auto)


 


 2.8 


 1.0-4.0


10^3/uL


 


Monocytes # (Auto)


 


 0.6 


 0.0-1.0


10^3/uL


 


Eosinophils # (Auto)


 


 0.1 


 0.0-0.3


10^3/uL


 


Basophils # (Auto)


 


 0.1 


 0.0-0.1


10^3/uL


 


Immature Granulocyte # (Auto)


 


 0.0 


 0.0-0.1


10^3/uL


 


Sodium Level  140  135-145  MMOL/L


 


Potassium Level  3.6  3.6-5.0  MMOL/L


 


Chloride Level  104    MMOL/L


 


Carbon Dioxide Level  23  21-32  MMOL/L


 


Anion Gap  13  5-14  MMOL/L


 


Blood Urea Nitrogen  14  7-18  MG/DL


 


Creatinine


 


 0.75 


 0.60-1.30


MG/DL


 


Estimat Glomerular Filtration


Rate 


 113 


  





 


BUN/Creatinine Ratio  19   


 


Glucose Level  87    MG/DL


 


Calcium Level  9.7  8.5-10.1  MG/DL


 


Corrected Calcium  9.4  8.5-10.1  MG/DL


 


Total Bilirubin  1.3 H 0.1-1.0  MG/DL


 


Aspartate Amino Transf


(AST/SGOT) 


 14 


 5-34  U/L





 


Alanine Aminotransferase


(ALT/SGPT) 


 10 


 0-55  U/L





 


Alkaline Phosphatase  69    U/L


 


Total Protein  7.4  6.4-8.2  GM/DL


 


Albumin  4.4  3.2-4.5  GM/DL





 (CELI GALLEGO MD)








Blood Pressure Mean:                    93











Progress


Progress Note :  


   Time:  11:50


Progress Note


patient assessed, will obtain CT head and neck. NS 1 L per IV, Zofran 4 mg IV 

and Tylenol.


1240 patient reports trace improvement in her headache.  CT and Labs reviewed. 


1300 we will give Toradol 30 mg IV.  Patient reports nausea improved after the 

Zofran.


1315 discharge instructions and return precautions reviewed with the patient and

her significant other.  All questions answered.


 (JUDY HAY)





Diagnostic Imaging





   Diagonstic Imaging:  CT


   Plain Films/CT/US/NM/MRI:  c-spine, other


Comments


NAME:   DEMARIO PRCIE


MED REC#:   [id number]


ACCOUNT#:   [id number]


PT STATUS:   REG ER


:   1995


PHYSICIAN:   JUDY HAY


ADMIT DATE:   23/ER


                                   ***Draft***


Date of Exam:23





CT HEAD/CERVICAL SPINE WO








CLINICAL INDICATION: Patient with headache, dizziness and sharp


pain to back of head. The pain has been going on for 2 weeks


since getting a concussion. Patient has pain all over neck worse


on the left side.





Exam: Head CT without IV contrast with sagittal and coronal


reformations. Axial CT scan of the cervical spine with sagittal


and coronal reformations. Auto Exposure Controls were utilized


during the CT exam to meet ALARA standards for radiation dose


reduction.





Comparison: CT scan the cervical spine without contrast dated


2020.





Findings:





Head CT:


There is no evidence of acute cerebral infarct, intracranial


hemorrhage, or gross mass effect. 





The brain parenchymal volume appears appropriate for patient's


age. There is normal gray-white matter distinction.  There is no


significant midline shift or herniation. 





 There is no evidence of hydrocephalus. The basal cisterns are


unremarkable. The skull, extracranial soft tissue, and orbits are


unremarkable. The paranasal sinuses are unremarkable. Temporal


bones show no significant abnormality.





Cervical spine:


No acute cervical spine fracture or dislocation. There is


straightening of the cervical spine posture which is nonspecific.


There is no prevertebral soft tissue swelling. Possible small


anterior disk bulge at the C3-C4 and C4-C5 levels. There is  no


significant neck soft tissue abnormality. Visualized upper lung


fields are clear.





Impression:





1: There is no evidence of acute intracranial process. There is


no skull fracture. There is no intracranial hemorrhage.





2: There is no acute cervical spine fracture.





3: There is straightening of the cervical spine posture which is


nonspecific but may be seen with patient positioning or muscle


spasms.





4: There appears to be small anterior disk bulges at the C3-C4


and C4-C5 levels.





  Dictated on workstation # [id number]








Dict:   23 [id number]


Trans:   23 1311


[facility] [id number]





Interpreted by:     BAIRON QUACH MD


Electronically signed by:


   Reviewed:  Reviewed by Me


 (JUDY HAY)





Departure


Impression





   Primary Impression:  


   Contusion of head


   Qualified Codes:  S00.03XA - Contusion of scalp, initial encounter


   Additional Impressions:  


   Concussion without loss of consciousness


   Qualified Codes:  S06.0X0A - Concussion without loss of consciousness, 

   initial encounter


   Neck pain


   Nausea


   Dehydration


Disposition:  01 HOME, SELF-CARE


Condition:  Improved





Departure-Patient Inst.


Decision time for Depature:  13:05


 (JUDY HAY)


Referrals:  


[facility] [place]





RACHAEL,LOCAL PHYSICIAN (PCP)


Primary Care Physician


Patient Instructions:  Concussion, Adult (DC)





Add. Discharge Instructions:  


Alternate between ibuprofen 600 mg and Tylenol 650 mg every 4 hours.


Brain rest, limit time on phones, tablets, computers or TV.


Wear sunglasses when around bright screens or outside in sunlight.


Alternate heat and ice to your neck.


Follow-up at [facility] walk-in or with your primary care there if symptoms or not 

improving or worsen.


Increase water intake, 16 ounces every 2 hours while awake.


Return to the emergency department for new, urgent healthcare needs. 





All discharge instructions reviewed with patient and/or family. Voiced 

understanding.


Scripts


Ondansetron (Ondansetron Odt) 4 Mg Tab.rapdis


4 MG PO Q6H PRN for NAUSEA/VOMITING, #10 TAB 0 Refills


   Prov: JUDY HAY         23


Work/School Note:  Work Release Form   Date Seen in the Emergency Department:  

2023


   Return to Work:  2023


   Restrictions:  No Restrictions








ATTENDING PHYSICIAN NOTE:


I was physically present as attending physician in the emergency department 

during the care of this patient, but I was not directly involved in the decision

making or delivery of care for this patient. 


 (CELI GALLEGO MD)











JUDY HAY                  2023 12:27


CELI GALLEGO MD        2023 07:07

## 2024-12-06 NOTE — XMS REPORT
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 differential 
- 17 05:45         









 Blood leukocytes automated count (number/volume)            8.6 10*3/uL       
     4.3-11.0        

 

 Blood erythrocytes automated count (number/volume)            3.42 10*6/uL    
        4.35-5.85        

 

 Venous blood hemoglobin measurement (mass/volume)            10.9 g/dL        
    11.5-16.0        

 

 Blood hematocrit (volume fraction)            31 %            35-52        

 

 Automated erythrocyte mean corpuscular volume            92 [foz_us]          
  80-99        

 

 Automated erythrocyte mean corpuscular hemoglobin (mass per erythrocyte)      
      32 pg            25-34        

 

 Automated erythrocyte mean corpuscular hemoglobin concentration measurement (
mass/volume)            35 g/dL            32-36        

 

 Automated erythrocyte distribution width ratio            12.5 %            
10.0-14.5        

 

 Automated blood platelet count (count/volume)            282 10*3/uL          
  130-400        

 

 Automated blood platelet mean volume measurement            10.1 [foz_us]     
       7.4-10.4        

 

 Automated blood neutrophils/100 leukocytes            51 %            42-75   
     

 

 Automated blood lymphocytes/100 leukocytes            38 %            12-44   
     

 

 Blood monocytes/100 leukocytes            9 %            0-12        

 

 Automated blood eosinophils/100 leukocytes            1 %            0-10     
   

 

 Automated blood basophils/100 leukocytes            1 %            0-10        

 

 Blood neutrophils automated count (number/volume)            4.4 10*3         
   1.8-7.8        

 

 Blood lymphocytes automated count (number/volume)            3.2 10*3         
   1.0-4.0        

 

 Blood monocytes automated count (number/volume)            0.8 10*3            
0.0-1.0        

 

 Automated eosinophil count            0.1 10*3/uL            0.0-0.3        

 

 Automated blood basophil count (count/volume)            0.1 10*3/uL          
  0.0-0.1        









 Whole blood basic metabolic panel - 17 05:45         









 Serum or plasma sodium measurement (moles/volume)            137 mmol/L       
     135-145        

 

 Serum or plasma potassium measurement (moles/volume)            4.0 mmol/L    
        3.6-5.0        

 

 Serum or plasma chloride measurement (moles/volume)            110 mmol/L     
               

 

 Carbon dioxide            19 mmol/L            21-32        

 

 Serum or plasma anion gap determination (moles/volume)            8 mmol/L    
        5-14        

 

 Serum or plasma urea nitrogen measurement (mass/volume)            6 mg/dL    
        7-18        

 

 Serum or plasma creatinine measurement (mass/volume)            0.51 mg/dL    
        0.60-1.30        

 

 Serum or plasma urea nitrogen/creatinine mass ratio            12             
NRG        

 

 Serum or plasma creatinine measurement with calculation of estimated 
glomerular filtration rate            >             NRG        

 

 Serum or plasma glucose measurement (mass/volume)            78 mg/dL         
           

 

 Serum or plasma calcium measurement (mass/volume)            8.6 mg/dL        
    8.5-10.1        









 Serum or plasma thyroxine (T4) free measurement (mass/volume) - 17 05:45
         









 Serum or plasma thyroxine (T4) free measurement (mass/volume)            1.39 
ng/dL            0.70-1.48        









 Hemoglobin A1c - 17 05:45         









 Hemoglobin A1c            4.8 %            4.5-6.2        









 Serum or plasma thyroperoxidase antibody assay (units/volume) - 17 05:45
         









 Serum or plasma thyroperoxidase antibody assay (units/volume)            40.34 
%            0..00        









 TRIIODOTHRYONINE T3 FREE - 17 05:45         









 TRIIODOTHYRONINE T3 FREE            3.6 pg/mL            2.4-4.5        









 Total triiodothyronine (T3) measurement - 17 05:45         









 Total triiodothyronine (T3) measurement            2.0 %            0.6-1.8   
     









 Capillary blood glucose measurement by glucometer (mass/volume) - 17 05:
47         









 Capillary blood glucose measurement by glucometer (mass/volume)            78 
mg/dL                    









 Complete urinalysis with reflex to culture - 12/15/17 20:05         









 Urine color determination            YELLOW             NRG        

 

 Urine clarity determination            VERY CLOUDY             NRG        

 

 Urine pH measurement by test strip            7             5-9        

 

 Specific gravity of urine by test strip            1.010             1.016-
1.022        

 

 Urine protein assay by test strip, semi-quantitative            NEGATIVE      
       NEGATIVE        

 

 Urine glucose detection by automated test strip            NEGATIVE           
  NEGATIVE        

 

 Erythrocytes detection in urine sediment by light microscopy            1+    
         NEGATIVE        

 

 Urine ketones detection by automated test strip            NEGATIVE           
  NEGATIVE        

 

 Urine nitrite detection by test strip            NEGATIVE             NEGATIVE
        

 

 Urine total bilirubin detection by test strip            NEGATIVE             
NEGATIVE        

 

 Urine urobilinogen measurement by automated test strip (mass/volume)          
  NORMAL             NORMAL        

 

 Urine leukocyte esterase detection by dipstick            1+             
NEGATIVE        

 

 Automated urine sediment erythrocyte count by microscopy (number/high power 
field)            NONE             NRG        

 

 Automated urine sediment leukocyte count by microscopy (number/high power field
)            RARE             NRG        

 

 Bacteria detection in urine sediment by light microscopy            TRACE     
        NRG        

 

 Squamous epithelial cells detection in urine sediment by light microscopy     
       2-5             NRG        

 

 Crystals detection in urine sediment by light microscopy            PRESENT   
          NRG        

 

 Casts detection in urine sediment by light microscopy            NONE         
    NRG        

 

 Mucus detection in urine sediment by light microscopy            NEGATIVE     
        NRG        

 

 Complete urinalysis with reflex to culture            NO             NRG      
  

 

 Amorphous sediment detection in urine sediment by light microscopy            
LARGE ROMMEL PHOSPHATE             NRG        



                                                      



Encounters

      





 ACCT No.            Visit Date/Time            Discharge            Status    
        Pt. Type            Provider            Facility            Loc./Unit  
          Complaint        

 

 [id number]            2018 16:25:00            2018 17:25:00    
        DIS            Emergency            JUDY HAY            [facility] 
[facility]            ER            [place]        

 

 [id number]            12/15/2017 19:54:00            12/15/2017 20:50:00    
        DIS            Outpatient            AJ LAUREN MD            [facility] 
[facility]            WSo            CRAMPING, CANT FEEL  
MOVEMENT FROM BABY        

 

 [id number]            2017 12:57:00            2017 23:59:59    
        CLS            Outpatient            MARLINE KYLE MD            [facility] 
[facility]            RAD            15 WKS GESTATION OF 
PREGNANCY        

 

 [id number]            2017 16:40:00            2017 08:43:00    
        DIS            Inpatient            AJ LAUREN MD            [facility] 
[facility]            LDRP            HYPEREMESIS GRAVIDERIM 
       

 

 [id number]            10/11/2017 15:19:00            10/11/2017 23:59:59    
        CLS            Outpatient            BASILIO KYLE MDEL J            [facility] 
[facility]            RAD            VAGINAL BLEEDING        

 

 [id number]            2017 17:30:00            2017 18:15:00    
        DIS            Emergency            NOE VALDIVIA            [facility] 
[facility]            ER            FINGER INFECTION        

 

 [id number]            2016 19:20:00            2016 20:02:00    
        DIS            Emergency            NOE VALDIVIA APRN            [facility] 
[facility]            ER            DENTAL PAIN        

 

 [id number]            2016 09:13:00            2016 09:56:00    
        DIS            Emergency            JULIÁN FUNK, CELI ARGUELLO            
[facility]            ER            R THUMB PAIN        

 

 [id number]            06/10/2016 14:57:00            06/10/2016 15:39:00    
        DIS            Emergency            NOE VALDIVIA APRN            [facility] 
[facility]            ER            RIGHT KNEE PAIN        

 

 [id number]            2016 15:27:00            2016 23:59:59    
        CLS            Outpatient            DAVID HAND            
[facility]            QUICK                     

 

 [id number]            2015 12:12:00            2015 13:25:00    
        DIS            Emergency            NOE VALDIVIA            [facility] 
[facility]            ER            CHAIR FELL ON L FOOT     
   

 

 [id number]            10/10/2011 12:09:00                                   
   Document Registration                                                       
     

 

 [id number]            2011 15:05:00                                   
   Document Registration                                                       
     

 

 [id number]            2011 10:47:00                                   
   Document Registration                                                       
     

 

 [id number]            2011 21:10:00                                   
   Document Registration                                                       
     

 

 [id number]            2011 12:04:00                                   
   Document Registration                                                       
     

 

 [id number]            2011 11:50:00                                   
   Document Registration                                                       
     

 

 [id number]            2011 07:24:00                                   
   Document Registration unscheduled primary elective  section   Viable female infant, Apgar 9/9, weight 2810 g  Hysterotomy closed in 1 layer using Caprosyn suture  Fascia was closed using 0-Vicryl suture  Grossly normal uterus, tubes, and ovaries  Subcutaneous tissue was reapproximated in running fashion using plain gut  deep dermal layer using plain gut   Skin was closed in subcuticular fashion using Monocryl suture  Prineo dressing in place  Patient and infant to recovery in stable condition    QBL: 240 cc  IVF: 1700 cc    UOP: 50  dictation #    Rowan Albright CNM unscheduled primary elective  section   Viable female infant, Apgar 9/9, weight 2810 g  Hysterotomy closed in 1 layer using Caprosyn suture  Fascia was closed using 0-Vicryl suture  Grossly normal uterus, tubes, and ovaries  Subcutaneous tissue was reapproximated in running fashion using plain gut  deep dermal layer using plain gut   Skin was closed in subcuticular fashion using Monocryl suture  Prineo dressing in place  Patient and infant to recovery in stable condition    QBL: 240 cc  IVF: 1700 cc    UOP: 50  dictation #[id number]    Rowan Albright CNM unscheduled primary elective  section   Viable female infant, Apgar 9/9, weight 2810 g  Hysterotomy closed in 1 layer using Caprosyn suture  Grossly normal uterus, tubes, and ovaries  Fascia was closed using 0-Vicryl suture  Subcutaneous tissue was reapproximated in running fashion using vicryl in 2 layers  Skin was closed in subcuticular fashion using Monocryl suture  Prineo dressing in place  Patient and infant to recovery in stable condition    QBL: 240 cc  IVF: 1700 cc    UOP: 50  dictation #[id number]    Rowan Albright CNM    Attending attestation:   Agree with above. Uncomplicated elective pLTCS of viable male.     Robbin PINEDA